# Patient Record
Sex: FEMALE | Race: WHITE | Employment: FULL TIME | URBAN - METROPOLITAN AREA
[De-identification: names, ages, dates, MRNs, and addresses within clinical notes are randomized per-mention and may not be internally consistent; named-entity substitution may affect disease eponyms.]

---

## 2024-03-01 ENCOUNTER — OFFICE VISIT (OUTPATIENT)
Dept: FAMILY MEDICINE CLINIC | Facility: CLINIC | Age: 49
End: 2024-03-01
Payer: COMMERCIAL

## 2024-03-01 VITALS
HEIGHT: 60 IN | OXYGEN SATURATION: 98 % | SYSTOLIC BLOOD PRESSURE: 112 MMHG | TEMPERATURE: 98.3 F | WEIGHT: 167 LBS | BODY MASS INDEX: 32.79 KG/M2 | HEART RATE: 83 BPM | DIASTOLIC BLOOD PRESSURE: 70 MMHG

## 2024-03-01 DIAGNOSIS — Z76.89 ENCOUNTER TO ESTABLISH CARE: ICD-10-CM

## 2024-03-01 DIAGNOSIS — F41.8 DEPRESSION WITH ANXIETY: Chronic | ICD-10-CM

## 2024-03-01 DIAGNOSIS — G89.29 NECK PAIN, CHRONIC: ICD-10-CM

## 2024-03-01 DIAGNOSIS — Z00.00 ROUTINE PHYSICAL EXAMINATION: ICD-10-CM

## 2024-03-01 DIAGNOSIS — G43.109 MIGRAINE WITH AURA AND WITHOUT STATUS MIGRAINOSUS, NOT INTRACTABLE: Chronic | ICD-10-CM

## 2024-03-01 DIAGNOSIS — Z12.4 CERVICAL CANCER SCREENING: ICD-10-CM

## 2024-03-01 DIAGNOSIS — Z12.11 COLON CANCER SCREENING: ICD-10-CM

## 2024-03-01 DIAGNOSIS — M54.2 NECK PAIN, CHRONIC: ICD-10-CM

## 2024-03-01 DIAGNOSIS — Z79.899 CONTROLLED SUBSTANCE AGREEMENT SIGNED: ICD-10-CM

## 2024-03-01 DIAGNOSIS — Z12.31 ENCOUNTER FOR SCREENING MAMMOGRAM FOR MALIGNANT NEOPLASM OF BREAST: ICD-10-CM

## 2024-03-01 DIAGNOSIS — F90.0 ATTENTION DEFICIT HYPERACTIVITY DISORDER (ADHD), PREDOMINANTLY INATTENTIVE TYPE: Chronic | ICD-10-CM

## 2024-03-01 DIAGNOSIS — J01.10 ACUTE NON-RECURRENT FRONTAL SINUSITIS: Primary | ICD-10-CM

## 2024-03-01 PROCEDURE — 99204 OFFICE O/P NEW MOD 45 MIN: CPT

## 2024-03-01 PROCEDURE — 99386 PREV VISIT NEW AGE 40-64: CPT

## 2024-03-01 RX ORDER — AZELASTINE 1 MG/ML
1 SPRAY, METERED NASAL 2 TIMES DAILY
COMMUNITY

## 2024-03-01 RX ORDER — METHOCARBAMOL 500 MG/1
500 TABLET, FILM COATED ORAL DAILY PRN
Qty: 30 TABLET | Refills: 0 | Status: SHIPPED | OUTPATIENT
Start: 2024-03-01

## 2024-03-01 RX ORDER — SUMATRIPTAN 25 MG/1
25 TABLET, FILM COATED ORAL ONCE AS NEEDED
Qty: 30 TABLET | Refills: 0 | Status: SHIPPED | OUTPATIENT
Start: 2024-03-01

## 2024-03-01 RX ORDER — FLUOXETINE HYDROCHLORIDE 20 MG/1
20 CAPSULE ORAL DAILY
COMMUNITY
Start: 2023-12-27

## 2024-03-01 RX ORDER — FLUTICASONE PROPIONATE 50 MCG
2 SPRAY, SUSPENSION (ML) NASAL DAILY
COMMUNITY
Start: 2023-10-17

## 2024-03-01 RX ORDER — SUMATRIPTAN 25 MG/1
25 TABLET, FILM COATED ORAL
COMMUNITY
Start: 2023-10-17 | End: 2024-03-01 | Stop reason: SDUPTHER

## 2024-03-01 RX ORDER — METHOCARBAMOL 500 MG/1
500 TABLET, FILM COATED ORAL DAILY PRN
COMMUNITY
Start: 2023-12-27 | End: 2024-03-01 | Stop reason: SDUPTHER

## 2024-03-01 RX ORDER — METHOCARBAMOL 750 MG/1
TABLET, FILM COATED ORAL
COMMUNITY
Start: 2023-12-27 | End: 2024-03-01 | Stop reason: DRUGHIGH

## 2024-03-01 RX ORDER — DEXTROAMPHETAMINE SACCHARATE, AMPHETAMINE ASPARTATE MONOHYDRATE, DEXTROAMPHETAMINE SULFATE AND AMPHETAMINE SULFATE 2.5; 2.5; 2.5; 2.5 MG/1; MG/1; MG/1; MG/1
20 CAPSULE, EXTENDED RELEASE ORAL EVERY MORNING
Qty: 30 CAPSULE | Refills: 0 | Status: SHIPPED | OUTPATIENT
Start: 2024-03-01

## 2024-03-01 RX ORDER — AZITHROMYCIN 250 MG/1
TABLET, FILM COATED ORAL
Qty: 6 TABLET | Refills: 0 | Status: SHIPPED | OUTPATIENT
Start: 2024-03-01 | End: 2024-03-05
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
## 2024-03-11 ENCOUNTER — APPOINTMENT (OUTPATIENT)
Dept: URGENT CARE | Facility: CLINIC | Age: 49
End: 2024-03-11
Payer: COMMERCIAL

## 2024-03-11 ENCOUNTER — APPOINTMENT (OUTPATIENT)
Dept: RADIOLOGY | Facility: CLINIC | Age: 49
End: 2024-03-11
Payer: COMMERCIAL

## 2024-03-11 ENCOUNTER — OFFICE VISIT (OUTPATIENT)
Dept: URGENT CARE | Facility: CLINIC | Age: 49
End: 2024-03-11
Payer: COMMERCIAL

## 2024-03-11 VITALS
RESPIRATION RATE: 18 BRPM | SYSTOLIC BLOOD PRESSURE: 125 MMHG | DIASTOLIC BLOOD PRESSURE: 78 MMHG | HEART RATE: 73 BPM | TEMPERATURE: 98.1 F | OXYGEN SATURATION: 98 %

## 2024-03-11 DIAGNOSIS — S82.831A CLOSED AVULSION FRACTURE OF DISTAL FIBULA, RIGHT, INITIAL ENCOUNTER: ICD-10-CM

## 2024-03-11 DIAGNOSIS — M25.571 ACUTE RIGHT ANKLE PAIN: ICD-10-CM

## 2024-03-11 DIAGNOSIS — M25.571 ACUTE RIGHT ANKLE PAIN: Primary | ICD-10-CM

## 2024-03-11 PROCEDURE — 73610 X-RAY EXAM OF ANKLE: CPT

## 2024-03-11 PROCEDURE — G0382 LEV 3 HOSP TYPE B ED VISIT: HCPCS

## 2024-03-11 NOTE — PROGRESS NOTES
[facility]        NAME: Lima Anglin is a 48 y.o. female  : 1975    MRN: [id number]  DATE: 2024  TIME: 2:02 PM    Assessment and Plan   Acute right ankle pain [M25.571]  1. Acute right ankle pain  XR ankle 3+ vw right      2. Closed avulsion fracture of distal fibula, right, initial encounter  Ambulatory Referral to Orthopedic Surgery        Xray showing possible distal fibula avulsion fracture. Will follow up with radiologist report when available. Placed in walking boot and referred to ortho. Offered crutches for partial weight bearing but declined.     Patient Instructions     Check my chart for final radiology results.   Rest and elevation.   Do not sleep with walking boot on.   Ice for 15min, 3-4x daily.  Tylenol/Motrin as needed for pain    If tests are performed, our office will contact you with results only if changes need to made to the care plan discussed with you at the visit. [facility]    PCP follow up.  Follow up with ortho.  Proceed to the ER with new or worsening symptoms such as pain, swelling, loss of color or sensation.     Chief Complaint     Chief Complaint   Patient presents with    Ankle Injury     Rolled ankle on Th.  States swelling and increased pain since. Does have the ankle wrapped. Indicates she iced, elevated, and took motrin.          History of Present Illness       The patient presents today with complaints of R ankle pain that started on Th. She states she twisted her ankle and fell. Was having pain and swelling. The swelling improved, but continues to have a burning pain. She has been icing the area, elevating, wrapped it with an ace bandage, and took motrin.         Review of Systems   Review of Systems   Musculoskeletal:  Positive for arthralgias (R ankle) and gait problem (R ankle). Negative for joint swelling.   Skin:  Negative for color change, rash and wound.         Current Medications        Current Outpatient Medications:     albuterol (PROVENTIL HFA,VENTOLIN HFA) 90 mcg/act inhaler, INHALE 2 PUFFS EVERY FOUR HOURS AS NEEDED FOR WHEEZING, Disp: , Rfl:     amphetamine-dextroamphetamine (ADDERALL XR) 10 MG 24 hr capsule, Take 2 capsules (20 mg total) by mouth every morning Max Daily Amount: 20 mg, Disp: 30 capsule, Rfl: 0    calcium carbonate-vitamin D 250 mg-3.125 mcg per tablet, Take 1 tablet by mouth daily, Disp: , Rfl:     cetirizine (ZyrTEC) 10 mg tablet, Take 10 mg by mouth daily, Disp: , Rfl:     EPINEPHrine (EPIPEN) 0.3 mg/0.3 mL SOAJ, inject 0.3 milliliters intramuscularly ONE TIME for 1 dose, Disp: , Rfl:     FLUoxetine (PROzac) 20 mg capsule, Take 20 mg by mouth daily, Disp: , Rfl:     fluticasone (FLONASE) 50 mcg/act nasal spray, 2 sprays into each nostril daily, Disp: , Rfl:     methocarbamol (ROBAXIN) 500 mg tablet, Take 1 tablet (500 mg total) by mouth daily as needed for muscle spasms, Disp: 30 tablet, Rfl: 0    montelukast (SINGULAIR) 10 mg tablet, Take 10 mg by mouth daily at bedtime, Disp: , Rfl:     SUMAtriptan (IMITREX) 25 mg tablet, Take 1 tablet (25 mg total) by mouth once as needed for migraine for up to 1 dose, Disp: 30 tablet, Rfl: 0    azelastine (ASTELIN) 0.1 % nasal spray, 1 spray into each nostril 2 (two) times a day Use in each nostril as directed, Disp: , Rfl:     Current Allergies     Allergies as of 03/11/2024 - Reviewed 03/11/2024   Allergen Reaction Noted    Codeine Edema 06/03/2020    Dog epithelium Sneezing 04/28/2015    Lactose intolerance (gi) - food allergy Diarrhea 02/01/2004    Peanut oil - food allergy Diarrhea 04/28/2015            The following portions of the patient's history were reviewed and updated as appropriate: allergies, current medications, past family history, past medical history, past social history, past surgical history and problem list.     Past Medical History:   Diagnosis Date    ADHD     Allergic     Asthma     Depression      Diverticulitis of colon     Obesity        Past Surgical History:   Procedure Laterality Date    BREAST SURGERY  2012       Family History   Problem Relation Age of Onset    Depression Mother     Diabetes Mother     ADD / ADHD Mother     Anxiety disorder Mother     Breast cancer Maternal Aunt          Medications have been verified.        Objective   /78   Pulse 73   Temp 98.1 °F (36.7 °C)   Resp 18   SpO2 98%        Physical Exam     Physical Exam  Vitals and nursing note reviewed.   Constitutional:       General: She is not in acute distress.     Appearance: Normal appearance.   HENT:      Head: Normocephalic and atraumatic.      Right Ear: External ear normal.      Left Ear: External ear normal.      Nose: Nose normal.      Mouth/Throat:      Lips: Pink.      Mouth: Mucous membranes are moist.   Eyes:      General: Vision grossly intact.      Extraocular Movements: Extraocular movements intact.      Pupils: Pupils are equal, round, and reactive to light.   Cardiovascular:      Rate and Rhythm: Normal rate.   Pulmonary:      Effort: Pulmonary effort is normal.   Musculoskeletal:         General: Normal range of motion.      Cervical back: Normal range of motion.      Right ankle: No swelling. Tenderness present. Normal range of motion. Normal pulse.      Left ankle: Normal.      Comments: R ankle: no swelling or ecchymosis. Full AROM; pain with dorsiflexion. TTP to middle of distal tibia. Sensation intact. Plus 2 pedal pulse.    Skin:     General: Skin is warm.   Neurological:      Mental Status: She is alert and oriented to person, place, and time.      Motor: Motor function is intact.      Gait: Gait is intact.   Psychiatric:         Attention and Perception: Attention normal.         Mood and Affect: Mood normal.

## 2024-03-11 NOTE — LETTER
March 11, 2024     Patient: Lima Anglin   YOB: 1975   Date of Visit: 3/11/2024       To Whom it May Concern:    Lima Anglin was seen in my clinic on 3/11/2024. She may return to work on 3/12/2024 .    If you have any questions or concerns, please don't hesitate to call.         Sincerely,          JG Banks        CC: No Recipients

## 2024-03-11 NOTE — PATIENT INSTRUCTIONS
Check my chart for final radiology results.   Rest and elevation.   Do not sleep with elastic bandage on.  Ice for 15min, 3-4x daily.  Tylenol/Motrin as needed for pain    If tests are performed, our office will contact you with results only if changes need to made to the care plan discussed with you at the visit. You can review your full results on [facility].    PCP follow up.  Follow up with ortho if pain does not improve over the next 5-7 days.   Proceed to the ER with new or worsening symptoms such as pain, swelling, loss of color or sensation.     Ankle Strain   WHAT YOU NEED TO KNOW:   A muscle strain is a twist, pull, or tear of a muscle or tendon in your ankle. An acute strain is a strain that happens suddenly. A chronic strain can happen over several days or weeks. A chronic strain can be caused by moving your ankle the same way over and over.  DISCHARGE INSTRUCTIONS:   Return to the emergency department if:   You have severe pain in your ankle when you rest or put pressure on it.     Your foot or toes are cold or numb.    Your swelling has increased.    Contact your healthcare provider if:   Your pain or swelling do not go away, even after treatment.    You have questions or concerns about your condition or care.    Medicines: You may need any of the following:  NSAIDs , such as ibuprofen, help decrease swelling, pain, and fever. This medicine is available with or without a doctor's order. NSAIDs can cause stomach bleeding or kidney problems in certain people. If you take blood thinner medicine, always ask if NSAIDs are safe for you. Always read the medicine label and follow directions. Do not give these medicines to children under 6 months of age without direction from your child's healthcare provider.     Acetaminophen  decreases pain. It is available without a doctor's order. Ask how much to take and how often to take it. Follow directions. Acetaminophen can cause liver damage if not taken  correctly.    Take your medicine as directed.  Contact your healthcare provider if you think your medicine is not helping or if you have side effects. Tell him of her if you are allergic to any medicine. Keep a list of the medicines, vitamins, and herbs you take. Include the amounts, and when and why you take them. Bring the list or the pill bottles to follow-up visits. Carry your medicine list with you in case of an emergency.    Self-care:   Rest  your ankle so that it can heal. Return to normal activities as directed.    Apply ice on your ankle for 15 to 20 minutes every hour or as directed. Use an ice pack, or put crushed ice in a plastic bag. Cover it with a towel. Ice helps prevent tissue damage and decreases swelling and pain.    Compress  your ankle as directed. Ask your healthcare provider how to wrap an elastic bandage around your ankle. An elastic bandage provides support and helps decrease swelling and movement so your ankle can heal. Wear it as long as directed.    Elevate  your ankle above the level of your heart as often as you can. This will help decrease swelling and pain. Prop your ankle on pillows or blankets to keep it elevated comfortably.       Prevent an ankle strain:   Always wear proper shoes when you play sports.  Replace your old running shoes with new ones often if you are a runner. Use special shoe inserts or arch supports to correct leg or foot problems. Ask your healthcare provider for more information on shoe supports.    Do warm up and cool down exercises.  Stretch before you work out or do sports activities. This will help loosen your muscles and prevent injury. Cool down and stretch after your workout. Do not stop and rest after a workout without cooling down first.     Do strength training exercises.  Exercises such as weight lifting help keep your muscles flexible and strong. A physical therapist or  may help you with these exercises.     Slowly start your exercise or  sports training program.  Follow your healthcare provider's advice on when to start exercising. Slowly increase time, distance, and intensity of your exercises. Sudden increases in how often or how intensely you train may cause you to injure your muscle again.    Follow up with your doctor as directed:  Write down your questions so you remember to ask them during your visits.   © Copyright [employer] 2021 Information is for End User's use only and may not be sold, redistributed or otherwise used for commercial purposes. All illustrations and images included in CareNotes® are the copyrighted property of [employer] [employer], [employer]. or [employer]  The above information is an  only. It is not intended as medical advice for individual conditions or treatments. Talk to your doctor, nurse or pharmacist before following any medical regimen to see if it is safe and effective for you.

## 2024-03-19 ENCOUNTER — OFFICE VISIT (OUTPATIENT)
Dept: URGENT CARE | Facility: CLINIC | Age: 49
End: 2024-03-19
Payer: COMMERCIAL

## 2024-03-19 VITALS
OXYGEN SATURATION: 98 % | SYSTOLIC BLOOD PRESSURE: 130 MMHG | HEART RATE: 86 BPM | TEMPERATURE: 97.7 F | DIASTOLIC BLOOD PRESSURE: 88 MMHG

## 2024-03-19 DIAGNOSIS — S93.401A SPRAIN OF RIGHT ANKLE, UNSPECIFIED LIGAMENT, INITIAL ENCOUNTER: Primary | ICD-10-CM

## 2024-03-19 PROCEDURE — G0382 LEV 3 HOSP TYPE B ED VISIT: HCPCS

## 2024-03-19 RX ORDER — IBUPROFEN 600 MG/1
600 TABLET ORAL EVERY 6 HOURS PRN
Qty: 28 TABLET | Refills: 0 | Status: SHIPPED | OUTPATIENT
Start: 2024-03-19 | End: 2024-03-26

## 2024-03-19 NOTE — LETTER
March 19, 2024     Patient: Lima Anglin   YOB: 1975   Date of Visit: 3/19/2024       To Whom it May Concern:    Lima Anglin was seen in my clinic on 3/19/2024. She may return on 03/20/2024.     If you have any questions or concerns, please don't hesitate to call.         Sincerely,          SATNAM Valdez        CC: No Recipients

## 2024-03-19 NOTE — PROGRESS NOTES
[facility]        NAME: Lima Anglin is a 48 y.o. female  : 1975    MRN: [id number]  DATE: 2024  TIME: 3:08 PM    Assessment and Plan   Sprain of right ankle, unspecified ligament, initial encounter [S93.401A]  1. Sprain of right ankle, unspecified ligament, initial encounter  ibuprofen (MOTRIN) 600 mg tablet      Continue rest, ice, compression and elevation for pain and swelling.   Ace wrap given in office.   Continue to alternate Tylenol and Ibuprofen as needed for pain. *NSAIDs (such as Ibuprofen, Aleve Advil, etc.) do increase the risk of GI bleed, particularly when taken in combination with certain medications such as SSRI antidepressants- do not take simultaneously*  Follow up with orthopedics as planned.     Patient Instructions   Ankle Sprain   WHAT YOU NEED TO KNOW:   An ankle sprain happens when 1 or more ligaments in your ankle joint stretch or tear. Ligaments are tough tissues that connect bones. Ligaments support your joints and keep your bones in place.  DISCHARGE INSTRUCTIONS:   Return to the emergency department if:   You have severe pain in your ankle.     Your foot or toes are cold or numb.     Your ankle becomes more weak or unstable (wobbly).     You are unable to put any weight on your ankle or foot.     Your swelling has increased or returned.     Call your doctor if:   Your pain does not go away, even after treatment.     You have questions or concerns about your condition or care.     Medicines:  You may need any of the following:  NSAIDs , such as ibuprofen, help decrease swelling, pain, and fever. This medicine is available with or without a doctor's order. NSAIDs can cause stomach bleeding or kidney problems in certain people. If you take blood thinner medicine, always ask your healthcare provider if NSAIDs are safe for you. Always read the medicine label and follow directions.     Acetaminophen  decreases pain and fever. It is available without a doctor's  order. Ask how much to take and how often to take it. Follow directions. Read the labels of all other medicines you are using to see if they also contain acetaminophen, or ask your doctor or pharmacist. Acetaminophen can cause liver damage if not taken correctly.     Prescription pain medicine  may be given. Ask your healthcare provider how to take this medicine safely. Some prescription pain medicines contain acetaminophen. Do not take other medicines that contain acetaminophen without talking to your healthcare provider. Too much acetaminophen may cause liver damage. Prescription pain medicine may cause constipation. Ask your healthcare provider how to prevent or treat constipation.      Take your medicine as directed.  Contact your healthcare provider if you think your medicine is not helping or if you have side effects. Tell your provider if you are allergic to any medicine. Keep a list of the medicines, vitamins, and herbs you take. Include the amounts, and when and why you take them. Bring the list or the pill bottles to follow-up visits. Carry your medicine list with you in case of an emergency.     Self-care:   Use support devices , such as a brace, cast, or splint, to limit your movement and protect your joint. You may need to use crutches to decrease your pain as you move around.     Go to physical therapy  as directed. A physical therapist teaches you exercises to help improve movement and strength, and to decrease pain.     Rest  your ankle so that it can heal. Return to normal activities as directed.     Apply ice  on your ankle for 15 to 20 minutes every hour or as directed. Use an ice pack, or put crushed ice in a plastic bag. Cover the ice pack or bag with a towel before you put it on your injury. Ice helps prevent tissue damage and decreases swelling and pain.     Compress  your ankle. Ask if you should wrap an elastic bandage around your injured ligament. An elastic bandage provides support and  helps decrease swelling and movement so your joint can heal. Wear as long as directed.          Elevate  your ankle above the level of your heart as often as you can. This will help decrease swelling and pain. Prop your ankle on pillows or blankets to keep it elevated comfortably.        Prevent another ankle sprain:   Let your ankle heal.  Find out how long your ligament needs to heal. Do not do any physical activity until your healthcare provider says it is okay. If you start activity too soon, you may develop a more serious injury.     Warm up and stretch before you exercise or play sports.  This helps your joints become strong and flexible.     Use the right equipment.  Always wear shoes that fit well and are made for the activity that you are doing. You may also need ankle supports, elbow and knee pads, or braces.     Follow up with your doctor as directed:  Write down your questions so you remember to ask them during your visits.  © Copyright [employer] 2023 Information is for End User's use only and may not be sold, redistributed or otherwise used for commercial purposes.  The above information is an  only. It is not intended as medical advice for individual conditions or treatments. Talk to your doctor, nurse or pharmacist before following any medical regimen to see if it is safe and effective for you.       Follow up with PCP in 3-5 days.  Proceed to  ER if symptoms worsen.    Chief Complaint     Chief Complaint   Patient presents with    Ankle Pain     Right ankle, pain. Patient is here today to see if her foot can be observed. Would like to have the provider explain the proper usage of the boot.          History of Present Illness       48 year old female, recently diagnosed with ankle sprain and possible avulsion fracture of lateral malleolus of right ankle after an inversion injury one week ago presents for evaluation of worsening pain with CAM boot. She was seen in this clinic one week ago  following the injury, and has been wearing a CAM boot since. Over the last 2-3 days, she reports noticing discomfort and irritation along the lateral aspect of her right ankle. She notes that a coworker advised her that she may be inflating the boot incorrectly. She also reports swelling today which resolved with compression and rest. She denies numbness, tingling, discoloration. She has yet to see the orthopedist and is waiting to reschedule her appointment due to recent viral illness.     Ankle Pain   Pertinent negatives include no numbness.       Review of Systems   Review of Systems   Constitutional:  Negative for fatigue and fever.   HENT:  Negative for congestion, ear discharge, ear pain, postnasal drip, rhinorrhea, sinus pressure, sinus pain, sneezing and sore throat.    Eyes: Negative.  Negative for pain, discharge, redness and itching.   Respiratory: Negative.  Negative for apnea, cough, choking, chest tightness, shortness of breath and stridor.    Cardiovascular: Negative.  Negative for chest pain and palpitations.   Gastrointestinal: Negative.  Negative for diarrhea, nausea and vomiting.   Endocrine: Negative.  Negative for polydipsia, polyphagia and polyuria.   Genitourinary: Negative.  Negative for decreased urine volume and flank pain.   Musculoskeletal:  Positive for arthralgias, gait problem and joint swelling. Negative for back pain, myalgias, neck pain and neck stiffness.   Skin: Negative.  Negative for color change and rash.   Allergic/Immunologic: Negative.  Negative for environmental allergies.   Neurological:  Negative for dizziness, facial asymmetry, light-headedness, numbness and headaches.   Hematological: Negative.  Negative for adenopathy.   Psychiatric/Behavioral: Negative.           Current Medications       Current Outpatient Medications:     ibuprofen (MOTRIN) 600 mg tablet, Take 1 tablet (600 mg total) by mouth every 6 (six) hours as needed for mild pain or moderate pain for up to 7  days, Disp: 28 tablet, Rfl: 0    albuterol (PROVENTIL HFA,VENTOLIN HFA) 90 mcg/act inhaler, INHALE 2 PUFFS EVERY FOUR HOURS AS NEEDED FOR WHEEZING, Disp: , Rfl:     amphetamine-dextroamphetamine (ADDERALL XR) 10 MG 24 hr capsule, Take 2 capsules (20 mg total) by mouth every morning Max Daily Amount: 20 mg, Disp: 30 capsule, Rfl: 0    azelastine (ASTELIN) 0.1 % nasal spray, 1 spray into each nostril 2 (two) times a day Use in each nostril as directed, Disp: , Rfl:     calcium carbonate-vitamin D 250 mg-3.125 mcg per tablet, Take 1 tablet by mouth daily, Disp: , Rfl:     cetirizine (ZyrTEC) 10 mg tablet, Take 10 mg by mouth daily, Disp: , Rfl:     EPINEPHrine (EPIPEN) 0.3 mg/0.3 mL SOAJ, inject 0.3 milliliters intramuscularly ONE TIME for 1 dose, Disp: , Rfl:     FLUoxetine (PROzac) 20 mg capsule, Take 20 mg by mouth daily, Disp: , Rfl:     fluticasone (FLONASE) 50 mcg/act nasal spray, 2 sprays into each nostril daily, Disp: , Rfl:     methocarbamol (ROBAXIN) 500 mg tablet, Take 1 tablet (500 mg total) by mouth daily as needed for muscle spasms, Disp: 30 tablet, Rfl: 0    montelukast (SINGULAIR) 10 mg tablet, Take 10 mg by mouth daily at bedtime, Disp: , Rfl:     SUMAtriptan (IMITREX) 25 mg tablet, Take 1 tablet (25 mg total) by mouth once as needed for migraine for up to 1 dose, Disp: 30 tablet, Rfl: 0    Current Allergies     Allergies as of 03/19/2024 - Reviewed 03/19/2024   Allergen Reaction Noted    Codeine Edema 06/03/2020    Dog epithelium Sneezing 04/28/2015    Lactose intolerance (gi) - food allergy Diarrhea 02/01/2004    Peanut oil - food allergy Diarrhea 04/28/2015            The following portions of the patient's history were reviewed and updated as appropriate: allergies, current medications, past family history, past medical history, past social history, past surgical history and problem list.     Past Medical History:   Diagnosis Date    ADHD     Allergic     Asthma     Depression     Diverticulitis of  colon     Obesity        Past Surgical History:   Procedure Laterality Date    BREAST SURGERY  2012       Family History   Problem Relation Age of Onset    Depression Mother     Diabetes Mother     ADD / ADHD Mother     Anxiety disorder Mother     Breast cancer Maternal Aunt          Medications have been verified.        Objective   /88   Pulse 86   Temp 97.7 °F (36.5 °C)   SpO2 98%        Physical Exam     Physical Exam  Vitals and nursing note reviewed.   Constitutional:       General: She is not in acute distress.     Appearance: Normal appearance. She is not ill-appearing, toxic-appearing or diaphoretic.   HENT:      Head: Normocephalic and atraumatic.      Right Ear: External ear normal.      Left Ear: External ear normal.      Nose: Nose normal. No congestion or rhinorrhea.      Mouth/Throat:      Mouth: Mucous membranes are moist.   Eyes:      Extraocular Movements: Extraocular movements intact.      Conjunctiva/sclera: Conjunctivae normal.      Pupils: Pupils are equal, round, and reactive to light.   Cardiovascular:      Rate and Rhythm: Normal rate and regular rhythm.      Pulses: Normal pulses.      Heart sounds: Normal heart sounds. No murmur heard.     No friction rub. No gallop.   Pulmonary:      Effort: Pulmonary effort is normal. No respiratory distress.      Breath sounds: Normal breath sounds. No stridor. No wheezing, rhonchi or rales.   Abdominal:      General: Bowel sounds are normal.      Palpations: Abdomen is soft.      Tenderness: There is no abdominal tenderness. There is no guarding or rebound.   Musculoskeletal:         General: Normal range of motion.      Cervical back: Normal range of motion and neck supple. No tenderness.      Right foot: Normal range of motion and normal capillary refill. Tenderness present. No swelling, deformity, bunion, Charcot foot, foot drop, prominent metatarsal heads, laceration, bony tenderness or crepitus. Normal pulse.        Legs:       Comments:  (+) TTP of right lateral malleolus.    Skin:     General: Skin is warm and dry.      Capillary Refill: Capillary refill takes less than 2 seconds.   Neurological:      General: No focal deficit present.      Mental Status: She is alert and oriented to person, place, and time.      Cranial Nerves: No cranial nerve deficit.   Psychiatric:         Mood and Affect: Mood normal.         Behavior: Behavior normal.

## 2024-03-19 NOTE — PATIENT INSTRUCTIONS
Continue rest, ice, compression and elevation for pain and swelling.   Ace wrap given in office.   Continue to alternate Tylenol and Ibuprofen as needed for pain. *NSAIDs (such as Ibuprofen, Aleve Advil, etc.) do increase the risk of GI bleed, particularly when taken in combination with certain medications such as SSRI antidepressants- do not take simultaneously*  Follow up with orthopedics as planned.

## 2024-03-25 ENCOUNTER — TELEPHONE (OUTPATIENT)
Dept: OBGYN CLINIC | Facility: HOSPITAL | Age: 49
End: 2024-03-25

## 2024-03-25 NOTE — TELEPHONE ENCOUNTER
Caller: [employer]    Doctor: Coty    Reason for call:  Please see Work Comp Info Below: (Upcoming Appointment)    DOI: 03/07/24    Claim# [id number]     Bill Claims: [address]  [facility], [address]     Name: Martha Clay    Phone# [phone]    Fax# (generic)- Does not have  Fax    [phone]    Call back#: [phone]

## 2024-03-29 ENCOUNTER — OFFICE VISIT (OUTPATIENT)
Dept: FAMILY MEDICINE CLINIC | Facility: CLINIC | Age: 49
End: 2024-03-29
Payer: COMMERCIAL

## 2024-03-29 VITALS
HEIGHT: 60 IN | HEART RATE: 84 BPM | WEIGHT: 167 LBS | BODY MASS INDEX: 32.79 KG/M2 | OXYGEN SATURATION: 99 % | SYSTOLIC BLOOD PRESSURE: 138 MMHG | DIASTOLIC BLOOD PRESSURE: 88 MMHG | TEMPERATURE: 97.3 F

## 2024-03-29 DIAGNOSIS — F90.0 ATTENTION DEFICIT HYPERACTIVITY DISORDER (ADHD), PREDOMINANTLY INATTENTIVE TYPE: Primary | ICD-10-CM

## 2024-03-29 DIAGNOSIS — G89.29 NECK PAIN, CHRONIC: ICD-10-CM

## 2024-03-29 DIAGNOSIS — M54.2 NECK PAIN, CHRONIC: ICD-10-CM

## 2024-03-29 PROCEDURE — 99214 OFFICE O/P EST MOD 30 MIN: CPT

## 2024-03-29 RX ORDER — DEXTROAMPHETAMINE SACCHARATE, AMPHETAMINE ASPARTATE MONOHYDRATE, DEXTROAMPHETAMINE SULFATE AND AMPHETAMINE SULFATE 2.5; 2.5; 2.5; 2.5 MG/1; MG/1; MG/1; MG/1
20 CAPSULE, EXTENDED RELEASE ORAL EVERY MORNING
Qty: 60 CAPSULE | Refills: 0 | Status: SHIPPED | OUTPATIENT
Start: 2024-03-29

## 2024-03-29 RX ORDER — METHOCARBAMOL 500 MG/1
500 TABLET, FILM COATED ORAL DAILY PRN
Qty: 30 TABLET | Refills: 0 | Status: SHIPPED | OUTPATIENT
Start: 2024-03-29

## 2024-03-29 NOTE — PROGRESS NOTES
Name: Lima Anglin      : 1975      MRN: [id number]  Encounter Provider: Lilly Tillman PA-C  Encounter Date: 3/29/2024   Encounter department: [facility]    Assessment & Plan     1. Attention deficit hyperactivity disorder (ADHD), predominantly inattentive type  -     [facility] Prescribed Meds and Special Instructions  -     Amphetamines, Methamphetamines  -     Butalbital  -     Phenobarbital  -     Secobarbital  -     Alprazolam  -     Clonazepam  -     Diazepam, Temazepam, Oxazepam  -     Lorazepam  -     Gabapentin  -     Pregabalin  -     Cocaine  -     Heroin  -     Buprenorphine  -     Levorphanol  -     Meperidine  -     Naltrexone  -     Fentanyl  -     Methadone  -     Oxycodone  -     Tapentadol  -     THC  -     Tramadol  -     Codeine, Hydrocodone, Hydropmorphone, Morphine  -     Bath Salts  -     Ethyl Glucuronide/Ethyl Sulfate  -     Kratom  -     Spice  -     Methylphenidate  -     Phentermine  -     Validity Oxidant  -     Validity Creatinine  -     Validity pH  -     Validity Specific  -     Xylazine Definitive Test  -     amphetamine-dextroamphetamine (ADDERALL XR) 10 MG 24 hr capsule; Take 2 capsules (20 mg total) by mouth every morning Max Daily Amount: 20 mg    2. Neck pain, chronic  -     methocarbamol (ROBAXIN) 500 mg tablet; Take 1 tablet (500 mg total) by mouth daily as needed for muscle spasms    Patient presents for one month ADHD follow up. Currently taking Adderall 10 mg ER BID which she has been doing well on; no side effects and feeling more focused/concentrated. UDS completed at today's visit. Otherwise follow up in three months for next scheduled follow up or sooner as needed. To call with any questions or concerns. Refilled medication at today's visit along with her robaxin.        Subjective      CC: ADHD one month follow up     Patient presents for one month medication follow up. Was restarted on her adderall 10 mg ER BID one month ago; UDS  to be completed at today's visit. Patient notes she has been feeling much better since restarting medication; more concentrated and focused especially at work. Denies any side effects with the medication. Doing well overall.       Review of Systems   Constitutional:  Negative for appetite change.   Respiratory:  Negative for chest tightness, shortness of breath and wheezing.    Cardiovascular:  Negative for chest pain, palpitations and leg swelling.   Gastrointestinal:  Negative for abdominal pain.   Neurological:  Negative for dizziness, light-headedness and headaches.       Current Outpatient Medications on File Prior to Visit   Medication Sig    albuterol (PROVENTIL HFA,VENTOLIN HFA) 90 mcg/act inhaler INHALE 2 PUFFS EVERY FOUR HOURS AS NEEDED FOR WHEEZING    azelastine (ASTELIN) 0.1 % nasal spray 1 spray into each nostril 2 (two) times a day Use in each nostril as directed    calcium carbonate-vitamin D 250 mg-3.125 mcg per tablet Take 1 tablet by mouth daily    cetirizine (ZyrTEC) 10 mg tablet Take 10 mg by mouth daily    EPINEPHrine (EPIPEN) 0.3 mg/0.3 mL SOAJ inject 0.3 milliliters intramuscularly ONE TIME for 1 dose    FLUoxetine (PROzac) 20 mg capsule Take 20 mg by mouth daily    fluticasone (FLONASE) 50 mcg/act nasal spray 2 sprays into each nostril daily    ibuprofen (MOTRIN) 600 mg tablet Take 1 tablet (600 mg total) by mouth every 6 (six) hours as needed for mild pain or moderate pain for up to 7 days    montelukast (SINGULAIR) 10 mg tablet Take 10 mg by mouth daily at bedtime    SUMAtriptan (IMITREX) 25 mg tablet Take 1 tablet (25 mg total) by mouth once as needed for migraine for up to 1 dose    [DISCONTINUED] amphetamine-dextroamphetamine (ADDERALL XR) 10 MG 24 hr capsule Take 2 capsules (20 mg total) by mouth every morning Max Daily Amount: 20 mg    [DISCONTINUED] methocarbamol (ROBAXIN) 500 mg tablet Take 1 tablet (500 mg total) by mouth daily as needed for muscle spasms       Objective     BP  138/88   Pulse 84   Temp (!) 97.3 °F (36.3 °C)   Ht 5' (1.524 m)   Wt 75.8 kg (167 lb)   SpO2 99%   BMI 32.61 kg/m²     Physical Exam  Constitutional:       General: She is not in acute distress.     Appearance: Normal appearance. She is not ill-appearing or diaphoretic.   HENT:      Head: Normocephalic and atraumatic.      Right Ear: External ear normal.      Left Ear: External ear normal.      Nose: Nose normal.      Mouth/Throat:      Mouth: Mucous membranes are moist.   Eyes:      General:         Right eye: No discharge.         Left eye: No discharge.      Conjunctiva/sclera: Conjunctivae normal.   Cardiovascular:      Rate and Rhythm: Normal rate and regular rhythm.      Pulses: Normal pulses.      Heart sounds: Normal heart sounds. No murmur heard.  Pulmonary:      Effort: Pulmonary effort is normal. No respiratory distress.      Breath sounds: Normal breath sounds. No wheezing, rhonchi or rales.   Musculoskeletal:         General: Normal range of motion.      Cervical back: Normal range of motion.   Skin:     General: Skin is warm.   Neurological:      General: No focal deficit present.      Mental Status: She is alert.      Gait: Gait normal.   Psychiatric:         Mood and Affect: Mood normal.       Lilly Tillman PA-C

## 2024-04-03 ENCOUNTER — TELEPHONE (OUTPATIENT)
Dept: OBGYN CLINIC | Facility: CLINIC | Age: 49
End: 2024-04-03

## 2024-04-03 LAB
4OH-XYLAZINE UR QL CFM: NEGATIVE NG/ML
6MAM UR QL CFM: NEGATIVE NG/ML
7AMINOCLONAZEPAM UR QL CFM: NEGATIVE NG/ML
A-OH ALPRAZ UR QL CFM: NEGATIVE NG/ML
ACCEPTABLE CREAT UR QL: NORMAL MG/DL
ACCEPTIBLE SP GR UR QL: NORMAL
AMPHET UR QL CFM: NORMAL NG/ML
BUPRENORPHINE UR QL CFM: NEGATIVE NG/ML
BUTALBITAL UR QL CFM: NEGATIVE NG/ML
BZE UR QL CFM: NEGATIVE NG/ML
CODEINE UR QL CFM: NEGATIVE NG/ML
EDDP UR QL CFM: NEGATIVE NG/ML
ETHYL GLUCURONIDE UR QL CFM: NEGATIVE NG/ML
ETHYL SULFATE UR QL SCN: NEGATIVE NG/ML
EUTYLONE UR QL: NEGATIVE NG/ML
FENTANYL UR QL CFM: NEGATIVE NG/ML
GLIADIN IGG SER IA-ACNC: NEGATIVE NG/ML
HYDROCODONE UR QL CFM: NEGATIVE NG/ML
HYDROMORPHONE UR QL CFM: NEGATIVE NG/ML
LORAZEPAM UR QL CFM: NEGATIVE NG/ML
ME-PHENIDATE UR QL CFM: NEGATIVE NG/ML
MEPERIDINE UR QL CFM: NEGATIVE NG/ML
METHADONE UR QL CFM: NEGATIVE NG/ML
METHAMPHET UR QL CFM: NEGATIVE NG/ML
MORPHINE UR QL CFM: NEGATIVE NG/ML
NALTREXONE UR QL CFM: NEGATIVE NG/ML
NITRITE UR QL: NORMAL UG/ML
NORBUPRENORPHINE UR QL CFM: NEGATIVE NG/ML
NORDIAZEPAM UR QL CFM: NEGATIVE NG/ML
NORFENTANYL UR QL CFM: NEGATIVE NG/ML
NORHYDROCODONE UR QL CFM: NEGATIVE NG/ML
NORMEPERIDINE UR QL CFM: NEGATIVE NG/ML
NOROXYCODONE UR QL CFM: NEGATIVE NG/ML
OXAZEPAM UR QL CFM: NEGATIVE NG/ML
OXYCODONE UR QL CFM: NEGATIVE NG/ML
OXYMORPHONE UR QL CFM: NEGATIVE NG/ML
PARA-FLUOROFENTANYL QUANTIFICATION: NORMAL NG/ML
PHENOBARB UR QL CFM: NEGATIVE NG/ML
RESULT ALL_PRESCRIBED MEDS AND SPECIAL INSTRUCTIONS: NORMAL
SECOBARBITAL UR QL CFM: NEGATIVE NG/ML
SL AMB 4-ANPP QUANTIFICATION: NORMAL NG/ML
SL AMB 5F-ADB-M7 METABOLITE QUANTIFICATION: NEGATIVE NG/ML
SL AMB 7-OH-MITRAGYNINE (KRATOM ALKALOID) QUANTIFICATION: NEGATIVE NG/ML
SL AMB AB-FUBINACA-M3 METABOLITE QUANTIFICATION: NEGATIVE NG/ML
SL AMB ACETYL FENTANYL QUANTIFICATION: NORMAL NG/ML
SL AMB ACETYL NORFENTANYL QUANTIFICATION: NORMAL NG/ML
SL AMB ACRYL FENTANYL QUANTIFICATION: NORMAL NG/ML
SL AMB CARFENTANIL QUANTIFICATION: NORMAL NG/ML
SL AMB CTHC (MARIJUANA METABOLITE) QUANTIFICATION: NEGATIVE NG/ML
SL AMB DEXTRORPHAN (DEXTROMETHORPHAN METABOLITE) QUANT: NEGATIVE NG/ML
SL AMB GABAPENTIN QUANTIFICATION: NEGATIVE
SL AMB JWH018 METABOLITE QUANTIFICATION: NEGATIVE NG/ML
SL AMB JWH073 METABOLITE QUANTIFICATION: NEGATIVE NG/ML
SL AMB MDMB-FUBINACA-M1 METABOLITE QUANTIFICATION: NEGATIVE NG/ML
SL AMB METHYLONE QUANTIFICATION: NEGATIVE NG/ML
SL AMB N-DESMETHYL-TRAMADOL QUANTIFICATION: NEGATIVE NG/ML
SL AMB PHENTERMINE QUANTIFICATION: NEGATIVE NG/ML
SL AMB PREGABALIN QUANTIFICATION: NEGATIVE
SL AMB RCS4 METABOLITE QUANTIFICATION: NEGATIVE NG/ML
SL AMB RITALINIC ACID QUANTIFICATION: NEGATIVE NG/ML
SMOOTH MUSCLE AB TITR SER IF: NEGATIVE NG/ML
SPECIMEN DRAWN SERPL: NEGATIVE NG/ML
SPECIMEN PH ACCEPTABLE UR: NORMAL
TAPENTADOL UR QL CFM: NEGATIVE NG/ML
TEMAZEPAM UR QL CFM: NEGATIVE NG/ML
TRAMADOL UR QL CFM: NEGATIVE NG/ML
URATE/CREAT 24H UR: NEGATIVE NG/ML
XYLAZINE UR QL CFM: NEGATIVE NG/ML

## 2024-04-15 DIAGNOSIS — J30.2 SEASONAL ALLERGIC RHINITIS, UNSPECIFIED TRIGGER: Primary | Chronic | ICD-10-CM

## 2024-04-15 DIAGNOSIS — F41.8 DEPRESSION WITH ANXIETY: Chronic | ICD-10-CM

## 2024-04-15 RX ORDER — MONTELUKAST SODIUM 10 MG/1
10 TABLET ORAL
Qty: 30 TABLET | Refills: 0 | Status: SHIPPED | OUTPATIENT
Start: 2024-04-15

## 2024-04-15 RX ORDER — FLUOXETINE HYDROCHLORIDE 20 MG/1
20 CAPSULE ORAL DAILY
Qty: 30 CAPSULE | Refills: 0 | Status: SHIPPED | OUTPATIENT
Start: 2024-04-15

## 2024-04-26 ENCOUNTER — OFFICE VISIT (OUTPATIENT)
Dept: OBGYN CLINIC | Facility: CLINIC | Age: 49
End: 2024-04-26
Payer: OTHER MISCELLANEOUS

## 2024-04-26 VITALS
HEART RATE: 91 BPM | HEIGHT: 60 IN | BODY MASS INDEX: 31.22 KG/M2 | SYSTOLIC BLOOD PRESSURE: 121 MMHG | DIASTOLIC BLOOD PRESSURE: 81 MMHG | WEIGHT: 159 LBS

## 2024-04-26 DIAGNOSIS — J30.9 ALLERGIC RHINITIS, UNSPECIFIED SEASONALITY, UNSPECIFIED TRIGGER: Primary | Chronic | ICD-10-CM

## 2024-04-26 DIAGNOSIS — F90.0 ATTENTION DEFICIT HYPERACTIVITY DISORDER (ADHD), PREDOMINANTLY INATTENTIVE TYPE: ICD-10-CM

## 2024-04-26 DIAGNOSIS — S96.911A RIGHT ANKLE STRAIN, INITIAL ENCOUNTER: ICD-10-CM

## 2024-04-26 DIAGNOSIS — M54.2 NECK PAIN, CHRONIC: ICD-10-CM

## 2024-04-26 DIAGNOSIS — S90.01XA CONTUSION OF RIGHT ANKLE, INITIAL ENCOUNTER: ICD-10-CM

## 2024-04-26 DIAGNOSIS — G89.29 NECK PAIN, CHRONIC: ICD-10-CM

## 2024-04-26 DIAGNOSIS — S93.491A SPRAIN OF ANTERIOR TALOFIBULAR LIGAMENT OF RIGHT ANKLE, INITIAL ENCOUNTER: Primary | ICD-10-CM

## 2024-04-26 PROCEDURE — 99203 OFFICE O/P NEW LOW 30 MIN: CPT | Performed by: FAMILY MEDICINE

## 2024-04-26 NOTE — LETTER
April 26, 2024     Patient: Lima Anglin  YOB: 1975  Date of Visit: 4/26/2024      To Whom it May Concern:    Lima Anglin is under my professional care. January was seen in my office on 4/26/2024. January may work with restrictions: wear ankle brace at all times.    January will be re-evaluated in 5 weeks.    If you have any questions or concerns, please don't hesitate to call.         Sincerely,          Donte Leonard DO        CC: No Recipients

## 2024-04-26 NOTE — PATIENT INSTRUCTIONS
Over the counter vitamins:    - turmeric vitamin at least 1000 mg daily    - tart cherry vitamin at least 1000 mg daily    Over the counter diclofenac/voltaren gel  - 3 times daily for 10 days    Ankle brace    Physical therapy and home exercises

## 2024-04-26 NOTE — PROGRESS NOTES
Assessment/Plan:  Assessment/Plan   Diagnoses and all orders for this visit:    Sprain of anterior talofibular ligament of right ankle, initial encounter  -     Brace  -     Ambulatory Referral to Physical Therapy; Future    Right ankle strain, initial encounter  -     Ambulatory Referral to Physical Therapy; Future    Contusion of right ankle, initial encounter  -     Ambulatory Referral to Physical Therapy; Future      48-year-old female with onset of right ankle pain from twisting injury outside of work on 3/7/2024.  Discussed with patient physical exam, radiographs, impression, and plan.  X-rays right ankle noted for well-corticated osseous fragment at the tip of the lateral malleolus.  Physical exam right ankle noted for mild tenderness at the ATFL, anterior ankle, and fibula.  She has intact range of motion of the ankle. There is mild weakness with inversion and eversion at 4+/5. Passive external rotation and syndesmosis squeeze are unremarkable.  She demonstrates heel walk and toe walk, however unable to do squatting due to pain at the anterior lateral aspect the ankle.  She is intact neurovascular.  Clinical impression is that osseous fragment is chronic and not acute.  She likely has symptoms from sprain.  I discussed treatment regimen of formal therapy, supplements, and topical anti-inflammatory.  She is to discontinue cam boot and I provided her lace up ankle brace to wear for support.  She is to take turmeric vitamin at least 1000 mg daily, tart cherry vitamin at least 1000 mg daily.  He is to apply topical diclofenac gel 3 times daily for the next 10 days.  She is to start physical therapy as soon as possible and do home exercises as directed.  She will follow-up in 5 weeks at which point she will be reevaluated.          Subjective:   Patient ID: Lima Anglin is a 48 y.o. female.  Chief Complaint   Patient presents with    Right Ankle - Pain        48-year-old female presents for evaluation right  ankle pain sustained from twisting injury outside of work on 3/7/2024.  She stepped into a hole in a parking lot and her ankle twisted.  She had pain described as sudden in onset, generalized and ankle but worse at the lateral and anterior aspects, radiating proximal to the lower leg, worse with bearing weight and ambulating, associated with swelling, and improved with resting.  She was seen in urgent care where x-ray evaluation was noted for irregularity at the lateral malleolus.  She was placed in cam boot and advised on resting.  She treated with icing, elevating, and taking ibuprofen.  She states that symptoms have been improving, but are still bothersome.  At home she ambulates with a cam boot and states that after about 2 hours of standing walking without cam boot she has worsening pain and swelling.  During the day when she is ambulating in cam boot she is able to do so for prolonged duration with little discomfort.  She does report that she is now started experiencing pain more at the anterior medial aspect the ankle.    Ankle Pain  This is a new problem. The current episode started more than 1 month ago. The problem occurs daily. The problem has been gradually improving. Associated symptoms include arthralgias and joint swelling. Pertinent negatives include no numbness or weakness. The symptoms are aggravated by standing, walking and twisting. She has tried rest, position changes, immobilization, NSAIDs and ice for the symptoms. The treatment provided mild relief.           The following portions of the patient's history were reviewed and updated as appropriate: She  has a past medical history of ADHD, Allergic, Asthma, Depression, Diverticulitis of colon, and Obesity.  She is allergic to codeine, dog epithelium, lactose intolerance (gi) - food allergy, and peanut oil - food allergy..    Review of Systems   Musculoskeletal:  Positive for arthralgias and joint swelling.   Neurological:  Negative for weakness  and numbness.       Objective:  Vitals:    04/26/24 1256   BP: 121/81   Pulse: 91   Weight: 72.1 kg (159 lb)   Height: 5' (1.524 m)      Right Ankle Exam     Range of Motion   The patient has normal right ankle ROM.    Muscle Strength   Dorsiflexion:  5/5  Plantar flexion:  5/5    Other   Sensation: normal  Pulse: present           Observations     Right Ankle/Foot   Negative for deformity.     Tenderness     Right Ankle/Foot   Tenderness in the anterior ankle, anterior talofibular ligament and lateral malleolus. No tenderness in the Achilles insertion, fifth metatarsal base, calcaneofibular ligament, deltoid ligament, dorsum foot, medial malleolus, peroneal tendon, plantar fascia, posterior tibial tendon, posterior talofibular ligament and proximal Achilles.     Strength/Myotome Testing     Right Ankle/Foot   Dorsiflexion: 5  Plantar flexion: 5  Inversion: 4+  Eversion: 4+    Tests     Right Ankle/Foot   Negative for anterior drawer, calcaneal squeeze, metatarsal squeeze, posterior drawer, syndesmosis squeeze and syndesmosis external rotation.       Physical Exam  Vitals and nursing note reviewed.   Constitutional:       Appearance: Normal appearance. She is well-developed. She is not ill-appearing or diaphoretic.   HENT:      Head: Normocephalic and atraumatic.      Right Ear: External ear normal.      Left Ear: External ear normal.   Eyes:      Conjunctiva/sclera: Conjunctivae normal.   Neck:      Trachea: No tracheal deviation.   Cardiovascular:      Rate and Rhythm: Normal rate.   Pulmonary:      Effort: Pulmonary effort is normal. No respiratory distress.   Abdominal:      General: There is no distension.   Musculoskeletal:         General: Tenderness present.      Right ankle: Tenderness present over the lateral malleolus and ATF ligament. No medial malleolus, CF ligament, posterior TF ligament or base of 5th metatarsal tenderness. Anterior drawer test negative.      Right foot: No deformity.   Skin:      General: Skin is warm and dry.      Coloration: Skin is not jaundiced or pale.   Neurological:      Mental Status: She is alert and oriented to person, place, and time.   Psychiatric:         Mood and Affect: Mood normal.         Behavior: Behavior normal.         Thought Content: Thought content normal.         Judgment: Judgment normal.         I have personally reviewed pertinent films in PACS and my interpretation is  .  X-rays right ankle noted for well-corticated osseous fragment at the tip of the lateral malleolus.

## 2024-04-29 ENCOUNTER — TELEPHONE (OUTPATIENT)
Age: 49
End: 2024-04-29

## 2024-04-29 RX ORDER — CETIRIZINE HYDROCHLORIDE 10 MG/1
10 TABLET ORAL DAILY
Qty: 30 TABLET | Refills: 2 | Status: SHIPPED | OUTPATIENT
Start: 2024-04-29

## 2024-04-29 RX ORDER — METHOCARBAMOL 500 MG/1
500 TABLET, FILM COATED ORAL DAILY PRN
Qty: 30 TABLET | Refills: 0 | Status: SHIPPED | OUTPATIENT
Start: 2024-04-29

## 2024-04-29 RX ORDER — DEXTROAMPHETAMINE SACCHARATE, AMPHETAMINE ASPARTATE MONOHYDRATE, DEXTROAMPHETAMINE SULFATE AND AMPHETAMINE SULFATE 2.5; 2.5; 2.5; 2.5 MG/1; MG/1; MG/1; MG/1
20 CAPSULE, EXTENDED RELEASE ORAL EVERY MORNING
Qty: 60 CAPSULE | Refills: 0 | Status: SHIPPED | OUTPATIENT
Start: 2024-04-29

## 2024-04-29 NOTE — TELEPHONE ENCOUNTER
Caller: Emery    Doctor/Office: Horacio    CB#: [phone]      What needs to be faxed: AVS and work note from 4/26    ATTN to: Jessica    Fax#: [phone]      Documents were successfully e-faxed

## 2024-05-10 ENCOUNTER — TELEPHONE (OUTPATIENT)
Age: 49
End: 2024-05-10

## 2024-05-10 NOTE — TELEPHONE ENCOUNTER
Caller: YUDELKA Dewitt     Doctor: Horacio     Reason for call: asked if patient has made a follow up appointment, information was given

## 2024-05-17 DIAGNOSIS — F41.8 DEPRESSION WITH ANXIETY: Chronic | ICD-10-CM

## 2024-05-17 DIAGNOSIS — J30.2 SEASONAL ALLERGIC RHINITIS, UNSPECIFIED TRIGGER: Chronic | ICD-10-CM

## 2024-05-17 RX ORDER — MONTELUKAST SODIUM 10 MG/1
10 TABLET ORAL
Qty: 30 TABLET | Refills: 0 | Status: SHIPPED | OUTPATIENT
Start: 2024-05-17

## 2024-05-17 RX ORDER — FLUOXETINE HYDROCHLORIDE 20 MG/1
20 CAPSULE ORAL DAILY
Qty: 30 CAPSULE | Refills: 0 | Status: SHIPPED | OUTPATIENT
Start: 2024-05-17

## 2024-05-28 DIAGNOSIS — J30.2 SEASONAL ALLERGIC RHINITIS, UNSPECIFIED TRIGGER: Chronic | ICD-10-CM

## 2024-05-28 DIAGNOSIS — M54.2 NECK PAIN, CHRONIC: ICD-10-CM

## 2024-05-28 DIAGNOSIS — G89.29 NECK PAIN, CHRONIC: ICD-10-CM

## 2024-05-28 DIAGNOSIS — F90.0 ATTENTION DEFICIT HYPERACTIVITY DISORDER (ADHD), PREDOMINANTLY INATTENTIVE TYPE: ICD-10-CM

## 2024-05-28 DIAGNOSIS — J30.9 ALLERGIC RHINITIS, UNSPECIFIED SEASONALITY, UNSPECIFIED TRIGGER: Chronic | ICD-10-CM

## 2024-05-28 DIAGNOSIS — F41.8 DEPRESSION WITH ANXIETY: Chronic | ICD-10-CM

## 2024-05-28 RX ORDER — MONTELUKAST SODIUM 10 MG/1
10 TABLET ORAL
Qty: 30 TABLET | Refills: 5 | Status: SHIPPED | OUTPATIENT
Start: 2024-05-28

## 2024-05-28 RX ORDER — FLUOXETINE HYDROCHLORIDE 20 MG/1
20 CAPSULE ORAL DAILY
Qty: 30 CAPSULE | Refills: 5 | Status: SHIPPED | OUTPATIENT
Start: 2024-05-28

## 2024-05-28 RX ORDER — CETIRIZINE HYDROCHLORIDE 10 MG/1
10 TABLET ORAL DAILY
Qty: 30 TABLET | Refills: 5 | Status: SHIPPED | OUTPATIENT
Start: 2024-05-28

## 2024-05-28 RX ORDER — METHOCARBAMOL 500 MG/1
500 TABLET, FILM COATED ORAL DAILY PRN
Qty: 30 TABLET | Refills: 0 | Status: SHIPPED | OUTPATIENT
Start: 2024-05-28

## 2024-05-28 RX ORDER — DEXTROAMPHETAMINE SACCHARATE, AMPHETAMINE ASPARTATE MONOHYDRATE, DEXTROAMPHETAMINE SULFATE AND AMPHETAMINE SULFATE 2.5; 2.5; 2.5; 2.5 MG/1; MG/1; MG/1; MG/1
20 CAPSULE, EXTENDED RELEASE ORAL EVERY MORNING
Qty: 60 CAPSULE | Refills: 0 | Status: SHIPPED | OUTPATIENT
Start: 2024-05-28

## 2024-06-24 DIAGNOSIS — F90.0 ATTENTION DEFICIT HYPERACTIVITY DISORDER (ADHD), PREDOMINANTLY INATTENTIVE TYPE: ICD-10-CM

## 2024-06-24 DIAGNOSIS — M54.2 NECK PAIN, CHRONIC: ICD-10-CM

## 2024-06-24 DIAGNOSIS — G89.29 NECK PAIN, CHRONIC: ICD-10-CM

## 2024-06-25 RX ORDER — DEXTROAMPHETAMINE SACCHARATE, AMPHETAMINE ASPARTATE MONOHYDRATE, DEXTROAMPHETAMINE SULFATE AND AMPHETAMINE SULFATE 2.5; 2.5; 2.5; 2.5 MG/1; MG/1; MG/1; MG/1
20 CAPSULE, EXTENDED RELEASE ORAL EVERY MORNING
Qty: 60 CAPSULE | Refills: 0 | Status: SHIPPED | OUTPATIENT
Start: 2024-06-25

## 2024-06-25 RX ORDER — METHOCARBAMOL 500 MG/1
TABLET, FILM COATED ORAL
Qty: 30 TABLET | Refills: 0 | Status: SHIPPED | OUTPATIENT
Start: 2024-06-25

## 2024-07-19 ENCOUNTER — OFFICE VISIT (OUTPATIENT)
Dept: FAMILY MEDICINE CLINIC | Facility: CLINIC | Age: 49
End: 2024-07-19
Payer: COMMERCIAL

## 2024-07-19 ENCOUNTER — OFFICE VISIT (OUTPATIENT)
Dept: OBGYN CLINIC | Facility: CLINIC | Age: 49
End: 2024-07-19
Payer: OTHER MISCELLANEOUS

## 2024-07-19 VITALS
SYSTOLIC BLOOD PRESSURE: 138 MMHG | TEMPERATURE: 99.3 F | BODY MASS INDEX: 31.02 KG/M2 | HEART RATE: 86 BPM | WEIGHT: 158 LBS | OXYGEN SATURATION: 100 % | DIASTOLIC BLOOD PRESSURE: 86 MMHG | HEIGHT: 60 IN

## 2024-07-19 VITALS
BODY MASS INDEX: 31.18 KG/M2 | SYSTOLIC BLOOD PRESSURE: 149 MMHG | WEIGHT: 158.8 LBS | HEART RATE: 114 BPM | HEIGHT: 60 IN | DIASTOLIC BLOOD PRESSURE: 95 MMHG

## 2024-07-19 DIAGNOSIS — Z12.11 COLON CANCER SCREENING: ICD-10-CM

## 2024-07-19 DIAGNOSIS — H60.502 ACUTE OTITIS EXTERNA OF LEFT EAR, UNSPECIFIED TYPE: ICD-10-CM

## 2024-07-19 DIAGNOSIS — J45.21 MILD INTERMITTENT ASTHMA WITH ACUTE EXACERBATION: Primary | Chronic | ICD-10-CM

## 2024-07-19 DIAGNOSIS — R03.0 ELEVATED BP WITHOUT DIAGNOSIS OF HYPERTENSION: ICD-10-CM

## 2024-07-19 DIAGNOSIS — J06.9 UPPER RESPIRATORY TRACT INFECTION, UNSPECIFIED TYPE: ICD-10-CM

## 2024-07-19 DIAGNOSIS — F98.8 ATTENTION DEFICIT DISORDER, UNSPECIFIED HYPERACTIVITY PRESENCE: Chronic | ICD-10-CM

## 2024-07-19 DIAGNOSIS — S99.911D RIGHT ANKLE INJURY, SUBSEQUENT ENCOUNTER: Primary | ICD-10-CM

## 2024-07-19 DIAGNOSIS — Z12.31 ENCOUNTER FOR SCREENING MAMMOGRAM FOR MALIGNANT NEOPLASM OF BREAST: ICD-10-CM

## 2024-07-19 PROCEDURE — 99214 OFFICE O/P EST MOD 30 MIN: CPT

## 2024-07-19 PROCEDURE — 99213 OFFICE O/P EST LOW 20 MIN: CPT | Performed by: FAMILY MEDICINE

## 2024-07-19 PROCEDURE — 93000 ELECTROCARDIOGRAM COMPLETE: CPT

## 2024-07-19 RX ORDER — AZITHROMYCIN 250 MG/1
TABLET, FILM COATED ORAL
Qty: 6 TABLET | Refills: 0 | Status: SHIPPED | OUTPATIENT
Start: 2024-07-19 | End: 2024-07-23

## 2024-07-19 RX ORDER — IBUPROFEN 800 MG/1
800 TABLET ORAL 2 TIMES DAILY
Qty: 40 TABLET | Refills: 0 | Status: SHIPPED | OUTPATIENT
Start: 2024-07-19

## 2024-07-19 RX ORDER — PREDNISONE 20 MG/1
20 TABLET ORAL DAILY
Qty: 5 TABLET | Refills: 0 | Status: SHIPPED | OUTPATIENT
Start: 2024-07-19 | End: 2024-07-24

## 2024-07-19 NOTE — ASSESSMENT & PLAN NOTE
- well controlled on current adderall 20 mg QD  - UTD on UDS and controlled substance agreement   - EKG completed today given recent high BP readings which showed NSR

## 2024-07-19 NOTE — ASSESSMENT & PLAN NOTE
- BP last two visits has been borderline high; today 138/86 mmHg   - EKG completed today which showed NSR  - pt thinks elevated BP related to ankle pain which she is seeing ortho for and stress from her relationship and living situation   - advised to get home cuff and monitor daily and keep log of it for the next two weeks  - will follow up for BP check in two weeks; if consistently elevated will discuss starting anti hypertensive agent at that time

## 2024-07-19 NOTE — LETTER
July 19, 2024     Patient: Lima Anglin  YOB: 1975  Date of Visit: 7/19/2024      To Whom it May Concern:    Lima Anglin is under my professional care. January was seen in my office on 7/19/2024.     January may work with restrictions: wear ankle brace at all times.     January is referred for MRI and will be re-evaluated afterward.    If you have any questions or concerns, please don't hesitate to call.         Sincerely,          Donte Leonard DO        CC: No Recipients

## 2024-07-19 NOTE — LETTER
July 19, 2024     Patient: Lima Anglin  YOB: 1975  Date of Visit: 7/19/2024      To Whom it May Concern:    Lima Anglin is under my professional care. January was seen in my office on 7/19/2024. January may return to work on 7/22/24 . Please excuse her from 7/9/24-7/22/24.     If you have any questions or concerns, please don't hesitate to call.         Sincerely,          Lilly Tillman PA-C        CC: No Recipients

## 2024-07-19 NOTE — PROGRESS NOTES
Assessment/Plan:  Assessment & Plan   Diagnoses and all orders for this visit:    Right ankle injury, subsequent encounter  -     MRI ankle/heel right  wo contrast; Future  -     ibuprofen (MOTRIN) 800 mg tablet; Take 1 tablet (800 mg total) by mouth 2 (two) times a day        48-year-old female with onset of right ankle pain from twisting injury outside of work on 3/7/2024.  Symptoms persist for more than 4 months with rest, bracing, NSAIDS, icing.  Will refer for MRI the right ankle to evaluate for stress/occult fracture and osteochondral lesion as invasive management may be warranted.  Follow-up after MRI is done.              Subjective:   Patient ID: Lima Anglin is a 49 y.o. female.  Chief Complaint   Patient presents with    Right Ankle - Follow-up, Swelling        48 year old female following up for right ankle pain onset from injury outside of work on 3/7/2024. She was last seen by me more than 2.5 months ago at which point she was placed in ankle brace and referred to formal therapy. She has not had PT since last visit. Reports pain localized to medial aspect the ankle, nonradiating, achy and throbbing and burning, worse with prolonged standing and ambulation, associated with swelling, and improved with resting. Footwear that places direct contact on medial malleolus is quite bothersome. Pain at the lateral ankle no longer bothersome. She has been icing and taking Ibuprofen to help with symptoms.    Ankle Pain  This is a new problem. The current episode started more than 1 month ago. The problem occurs daily. The problem has been unchanged. Associated symptoms include arthralgias and joint swelling. Pertinent negatives include no numbness or weakness. The symptoms are aggravated by standing and walking. She has tried rest, position changes, NSAIDs and ice for the symptoms. The treatment provided mild relief.               Review of Systems   Musculoskeletal:  Positive for arthralgias and joint  swelling.   Neurological:  Negative for weakness and numbness.       Objective:  Vitals:    07/19/24 1403   BP: 149/95   Pulse: (!) 114   Weight: 72 kg (158 lb 12.8 oz)   Height: 5' (1.524 m)      Right Ankle Exam   Swelling: mild (medial)    Range of Motion   Dorsiflexion:  5   Plantar flexion:  normal   Eversion:  normal   Inversion:  10     Muscle Strength   Dorsiflexion:  5/5  Plantar flexion:  5/5    Other   Sensation: normal  Pulse: present           Observations     Right Ankle/Foot   Negative for deformity.     Tenderness     Right Ankle/Foot   Tenderness in the medial malleolus. No tenderness in the Achilles insertion, anterior ankle, anterior talofibular ligament, fifth metatarsal base, calcaneofibular ligament, dorsum foot, lateral malleolus, navicular, peroneal tendon, plantar fascia, posterior tibial tendon, posterior talofibular ligament, proximal Achilles and talar dome.     Strength/Myotome Testing     Right Ankle/Foot   Dorsiflexion: 5  Plantar flexion: 5  Inversion: 5  Eversion: 5    Tests     Right Ankle/Foot   Negative for anterior drawer, calcaneal squeeze, metatarsal squeeze, posterior drawer, syndesmosis squeeze and syndesmosis external rotation.       Physical Exam  Vitals and nursing note reviewed.   Constitutional:       Appearance: Normal appearance. She is well-developed. She is not ill-appearing or diaphoretic.   HENT:      Head: Normocephalic and atraumatic.      Right Ear: External ear normal.      Left Ear: External ear normal.   Eyes:      Conjunctiva/sclera: Conjunctivae normal.   Neck:      Trachea: No tracheal deviation.   Cardiovascular:      Comments: Tachycardic  Pulmonary:      Effort: Pulmonary effort is normal. No respiratory distress.   Abdominal:      General: There is no distension.   Musculoskeletal:         General: Swelling and tenderness present.      Right ankle: Tenderness present over the medial malleolus. No lateral malleolus, ATF ligament, CF ligament, posterior  TF ligament or base of 5th metatarsal tenderness. Anterior drawer test negative.      Right foot: No deformity.   Skin:     General: Skin is warm and dry.      Coloration: Skin is not jaundiced or pale.   Neurological:      Mental Status: She is alert and oriented to person, place, and time.   Psychiatric:         Mood and Affect: Mood normal.         Behavior: Behavior normal.         Thought Content: Thought content normal.         Judgment: Judgment normal.

## 2024-07-19 NOTE — PROGRESS NOTES
Ambulatory Visit  Name: Lima Anglin      : 1975      MRN: [id number]  Encounter Provider: Lilly Tillman PA-C  Encounter Date: 2024   Encounter department: [facility]    Assessment & Plan   1. Mild intermittent asthma with acute exacerbation  Assessment & Plan:  - pt has been sick for about two weeks (home test for covid was positive last week), continuous productive cough and wheezing with increased use of albuterol inhaler   - on exam, slight expiratory wheezing heard; O2 100% on room air   - treating for asthma exacerbation with prednisone burst (20 mg x 5 days) and azithromycin; discussed side effects of both medications   - pt concerned she has pneumonia; low suspicion given normal lung sounds however ordered CXR to rule out given her concern   - otherwise encouraged supportive care and rest  - advised to call if symptoms persist/worsen despite tx   - ER if she develop any chest pain or sob   - provided work note   Orders:  -     predniSONE 20 mg tablet; Take 1 tablet (20 mg total) by mouth daily for 5 days  -     azithromycin (ZITHROMAX) 250 mg tablet; Take 2 tablets today then 1 tablet daily x 4 days  -     XR chest pa & lateral; Future; Expected date: 2024  2. Upper respiratory tract infection, unspecified type  -     azithromycin (ZITHROMAX) 250 mg tablet; Take 2 tablets today then 1 tablet daily x 4 days  3. Acute otitis externa of left ear, unspecified type  Assessment & Plan:  - left ear pain x 1 week since covid   - on exam, inner ear canal erythematous and swollen with tenderness to palpation of tragus  - therefore treating for OE with cortisporin drops q6hr x 7 days   - advised to call if symptoms persist or worsen despite tx   Orders:  -     neomycin-polymyxin-hydrocortisone (CORTISPORIN) otic solution; Administer 4 drops into the left ear every 6 (six) hours for 7 days  4. Attention deficit disorder, unspecified hyperactivity presence  Assessment &  Plan:  - well controlled on current adderall 20 mg QD  - UTD on UDS and controlled substance agreement   - EKG completed today given recent high BP readings which showed NSR   Orders:  -     POCT ECG  5. Elevated BP without diagnosis of hypertension  Assessment & Plan:  - BP last two visits has been borderline high; today 138/86 mmHg   - EKG completed today which showed NSR  - pt thinks elevated BP related to ankle pain which she is seeing ortho for and stress from her relationship and living situation   - advised to get home cuff and monitor daily and keep log of it for the next two weeks  - will follow up for BP check in two weeks; if consistently elevated will discuss starting anti hypertensive agent at that time   6. Encounter for screening mammogram for malignant neoplasm of breast  -     Mammo screening bilateral w 3d & cad; Future  7. Colon cancer screening  -     Cologuard         History of Present Illness     CC: ADD follow up; sick     Patient presents for ADD follow up. Patient currently on adderall 20 mg QD. She feels her symptoms are well controlled on current dosage and she is doing well on the medication with no reportable side effects.     Patient also reports she has been sick for about two weeks now. Reports tested positive for covid a week ago. Is feeling better however still feels congestion in her chest with productive cough and has been having to use her inhaler more for her asthma. No fevers. Also reports left ear pain; has chronic issues with this ear. Needs note for work.       Review of Systems   Constitutional:  Negative for chills, diaphoresis and fever.   HENT:  Positive for congestion, ear pain (left), rhinorrhea and sinus pressure. Negative for sore throat.    Respiratory:  Positive for cough and wheezing. Negative for chest tightness and shortness of breath.    Cardiovascular:  Negative for chest pain and palpitations.   Neurological:  Negative for headaches.     Past Medical  History:   Diagnosis Date    ADHD     Allergic     Asthma     Depression     Diverticulitis of colon     Obesity      Past Surgical History:   Procedure Laterality Date    BREAST SURGERY  2012     Family History   Problem Relation Age of Onset    Depression Mother     Diabetes Mother     ADD / ADHD Mother     Anxiety disorder Mother     Breast cancer Maternal Aunt      Social History     Tobacco Use    Smoking status: Never    Smokeless tobacco: Never   Vaping Use    Vaping status: Never Used   Substance and Sexual Activity    Alcohol use: Not Currently     Comment: social    Drug use: Never    Sexual activity: Yes     Partners: Male     Birth control/protection: Male Sterilization     Current Outpatient Medications on File Prior to Visit   Medication Sig    albuterol (PROVENTIL HFA,VENTOLIN HFA) 90 mcg/act inhaler INHALE 2 PUFFS EVERY FOUR HOURS AS NEEDED FOR WHEEZING    amphetamine-dextroamphetamine (ADDERALL XR) 10 MG 24 hr capsule take 2 capsules by mouth every morning    azelastine (ASTELIN) 0.1 % nasal spray 1 spray into each nostril 2 (two) times a day Use in each nostril as directed    calcium carbonate-vitamin D 250 mg-3.125 mcg per tablet Take 1 tablet by mouth daily    cetirizine (ZyrTEC) 10 mg tablet Take 1 tablet (10 mg total) by mouth daily    EPINEPHrine (EPIPEN) 0.3 mg/0.3 mL SOAJ inject 0.3 milliliters intramuscularly ONE TIME for 1 dose    FLUoxetine (PROzac) 20 mg capsule Take 1 capsule (20 mg total) by mouth daily    fluticasone (FLONASE) 50 mcg/act nasal spray 2 sprays into each nostril daily    ibuprofen (MOTRIN) 600 mg tablet Take 1 tablet (600 mg total) by mouth every 6 (six) hours as needed for mild pain or moderate pain for up to 7 days    ibuprofen (MOTRIN) 800 mg tablet Take 1 tablet (800 mg total) by mouth 2 (two) times a day    methocarbamol (ROBAXIN) 500 mg tablet take 1 tablet by mouth once daily if needed for muscle spasm    montelukast (SINGULAIR) 10 mg tablet Take 1 tablet (10 mg  total) by mouth daily at bedtime    SUMAtriptan (IMITREX) 25 mg tablet Take 1 tablet (25 mg total) by mouth once as needed for migraine for up to 1 dose     Allergies   Allergen Reactions    Codeine Edema     Swelling    Dog Epithelium Sneezing     sneezing    Lactose Intolerance (Gi) - Food Allergy Diarrhea    Peanut Oil - Food Allergy Diarrhea     Immunization History   Administered Date(s) Administered    COVID-19 MODERNA VACC 0.5 ML IM 01/12/2021, 02/27/2021    INFLUENZA 01/01/2007, 10/09/2012, 10/22/2014, 09/28/2015, 11/03/2016, 10/10/2017, 11/05/2018, 06/29/2019, 10/09/2019, 10/11/2020, 11/11/2022, 10/17/2023    Pneumococcal Conjugate Vaccine 20-valent (Pcv20), Polysace 11/11/2022    Pneumococcal Polysaccharide PPV23 04/29/2015    Tdap 11/23/2009, 02/04/2019     Objective     /86   Pulse 86   Temp 99.3 °F (37.4 °C)   Ht 5' (1.524 m)   Wt 71.7 kg (158 lb)   SpO2 100%   BMI 30.86 kg/m²     Physical Exam  Vitals reviewed.   Constitutional:       General: She is not in acute distress.     Appearance: Normal appearance. She is not ill-appearing or diaphoretic.   HENT:      Head: Normocephalic and atraumatic.      Right Ear: Tympanic membrane, ear canal and external ear normal. There is no impacted cerumen.      Left Ear: Tympanic membrane and external ear normal. Swelling (inner ear swollen and erythematous) and tenderness (to palpation of tragus) present.      Nose: Congestion present. No rhinorrhea.      Mouth/Throat:      Mouth: Mucous membranes are moist.      Pharynx: Oropharynx is clear. No oropharyngeal exudate or posterior oropharyngeal erythema.   Eyes:      General:         Right eye: No discharge.         Left eye: No discharge.      Conjunctiva/sclera: Conjunctivae normal.   Cardiovascular:      Rate and Rhythm: Normal rate and regular rhythm.      Pulses: Normal pulses.      Heart sounds: Normal heart sounds. No murmur heard.  Pulmonary:      Effort: Pulmonary effort is normal. No  respiratory distress.      Breath sounds: Wheezing (slight expiratory wheezing) present. No rhonchi or rales.   Musculoskeletal:         General: Normal range of motion.      Cervical back: Normal range of motion and neck supple.      Right lower leg: No edema.      Left lower leg: No edema.   Lymphadenopathy:      Cervical: No cervical adenopathy.   Skin:     General: Skin is warm.   Neurological:      General: No focal deficit present.      Mental Status: She is alert.      Gait: Gait normal.   Psychiatric:         Mood and Affect: Mood normal.

## 2024-07-19 NOTE — ASSESSMENT & PLAN NOTE
- pt has been sick for about two weeks (home test for covid was positive last week), continuous productive cough and wheezing with increased use of albuterol inhaler   - on exam, slight expiratory wheezing heard; O2 100% on room air   - treating for asthma exacerbation with prednisone burst (20 mg x 5 days) and azithromycin; discussed side effects of both medications   - pt concerned she has pneumonia; low suspicion given normal lung sounds however ordered CXR to rule out given her concern   - otherwise encouraged supportive care and rest  - advised to call if symptoms persist/worsen despite tx   - ER if she develop any chest pain or sob   - provided work note

## 2024-07-19 NOTE — ASSESSMENT & PLAN NOTE
- left ear pain x 1 week since covid   - on exam, inner ear canal erythematous and swollen with tenderness to palpation of tragus  - therefore treating for OE with cortisporin drops q6hr x 7 days   - advised to call if symptoms persist or worsen despite tx

## 2024-07-23 ENCOUNTER — TELEPHONE (OUTPATIENT)
Age: 49
End: 2024-07-23

## 2024-07-23 NOTE — TELEPHONE ENCOUNTER
Caller: W/C    Doctor/Office: [place]       What needs to be faxed: Office note from 7/19/24      Fax#: [phone]       Documents were successfully e-faxed

## 2024-07-26 ENCOUNTER — TELEPHONE (OUTPATIENT)
Dept: OBGYN CLINIC | Facility: HOSPITAL | Age: 49
End: 2024-07-26

## 2024-07-26 NOTE — TELEPHONE ENCOUNTER
Caller: Dejon- Work Comp    Doctor: Horacio    Reason for call: Asking if MRI script can be faxed to her.    Fax# [phone]    Call back#: [phone]

## 2024-07-27 DIAGNOSIS — F90.0 ATTENTION DEFICIT HYPERACTIVITY DISORDER (ADHD), PREDOMINANTLY INATTENTIVE TYPE: ICD-10-CM

## 2024-07-27 DIAGNOSIS — M54.2 NECK PAIN, CHRONIC: ICD-10-CM

## 2024-07-27 DIAGNOSIS — G89.29 NECK PAIN, CHRONIC: ICD-10-CM

## 2024-07-29 RX ORDER — DEXTROAMPHETAMINE SACCHARATE, AMPHETAMINE ASPARTATE MONOHYDRATE, DEXTROAMPHETAMINE SULFATE AND AMPHETAMINE SULFATE 2.5; 2.5; 2.5; 2.5 MG/1; MG/1; MG/1; MG/1
20 CAPSULE, EXTENDED RELEASE ORAL EVERY MORNING
Qty: 60 CAPSULE | Refills: 0 | Status: SHIPPED | OUTPATIENT
Start: 2024-07-29

## 2024-07-29 RX ORDER — METHOCARBAMOL 500 MG/1
500 TABLET, FILM COATED ORAL ONCE AS NEEDED
Qty: 15 TABLET | Refills: 0 | Status: SHIPPED | OUTPATIENT
Start: 2024-07-29

## 2024-08-01 DIAGNOSIS — T36.95XA ANTIBIOTIC-INDUCED YEAST INFECTION: Primary | ICD-10-CM

## 2024-08-01 DIAGNOSIS — B37.9 ANTIBIOTIC-INDUCED YEAST INFECTION: Primary | ICD-10-CM

## 2024-08-01 RX ORDER — FLUCONAZOLE 150 MG/1
150 TABLET ORAL ONCE
Qty: 1 TABLET | Refills: 0 | Status: SHIPPED | OUTPATIENT
Start: 2024-08-01 | End: 2024-08-01

## 2024-08-03 DIAGNOSIS — E55.9 VITAMIN D DEFICIENCY: Primary | ICD-10-CM

## 2024-08-04 ENCOUNTER — OFFICE VISIT (OUTPATIENT)
Age: 49
End: 2024-08-04
Payer: COMMERCIAL

## 2024-08-04 VITALS
WEIGHT: 158.2 LBS | TEMPERATURE: 98.1 F | OXYGEN SATURATION: 96 % | SYSTOLIC BLOOD PRESSURE: 142 MMHG | HEART RATE: 91 BPM | HEIGHT: 65 IN | BODY MASS INDEX: 26.36 KG/M2 | RESPIRATION RATE: 20 BRPM | DIASTOLIC BLOOD PRESSURE: 93 MMHG

## 2024-08-04 DIAGNOSIS — T78.40XA ALLERGIC REACTION, INITIAL ENCOUNTER: Primary | ICD-10-CM

## 2024-08-04 PROCEDURE — 96372 THER/PROPH/DIAG INJ SC/IM: CPT | Performed by: EMERGENCY MEDICINE

## 2024-08-04 PROCEDURE — G0382 LEV 3 HOSP TYPE B ED VISIT: HCPCS | Performed by: EMERGENCY MEDICINE

## 2024-08-04 RX ORDER — EPINEPHRINE 0.3 MG/.3ML
0.3 INJECTION SUBCUTANEOUS ONCE
Qty: 0.6 ML | Refills: 0 | Status: SHIPPED | OUTPATIENT
Start: 2024-08-04 | End: 2024-08-04

## 2024-08-04 RX ORDER — PREDNISONE 10 MG/1
TABLET ORAL
Qty: 27 TABLET | Refills: 0 | Status: SHIPPED | OUTPATIENT
Start: 2024-08-04

## 2024-08-04 RX ORDER — EPINEPHRINE 0.3 MG/.3ML
INJECTION SUBCUTANEOUS
Refills: 0 | OUTPATIENT
Start: 2024-08-04

## 2024-08-04 RX ORDER — DIPHENHYDRAMINE HYDROCHLORIDE 50 MG/ML
25 INJECTION INTRAMUSCULAR; INTRAVENOUS ONCE
Status: COMPLETED | OUTPATIENT
Start: 2024-08-04 | End: 2024-08-04

## 2024-08-04 RX ADMIN — DIPHENHYDRAMINE HYDROCHLORIDE 25 MG: 50 INJECTION INTRAMUSCULAR; INTRAVENOUS at 13:34

## 2024-08-04 NOTE — PROGRESS NOTES
"[facility]        NAME: Lima Anglin is a 49 y.o. female  : 1975    MRN: [id number]  DATE: 2024  TIME: 1:51 PM    Assessment and Plan   Allergic reaction, initial encounter [T78.40XA]  1. Allergic reaction, initial encounter  diphenhydrAMINE (BENADRYL) injection 25 mg    predniSONE 10 mg tablet    EPINEPHrine (EPIPEN) 0.3 mg/0.3 mL SOAJ            Patient Instructions     Patient Instructions   Use ice pack or cold compresses to avoid scratching  Take an H1 antihistamine like Benadryl or non-sedating antihistamine like Zyrtec, Allegra or Claritin, AND an H2 antihistamine like Pepcid or Zantac for itch.  If you are diabetic you should adhere strictly to your diabetic diet and monitor blood sugar closely while on prednisone and you should discontinue the prednisone if blood sugar becomes significantly elevated.  Avoid nonsteroidal anti-inflammatories like Advil or Aleve while on prednisone.   Patient Education     Angioedema   The Basics   Written by the doctors and editors at [facility]   What is angioedema? -- This is a condition that causes puffiness in the tissue under the skin. It can involve swelling of the face, eyelids, ears, mouth, tongue, hands, feet, or genitals (picture 1 and picture 2).  Some people who get angioedema also get hives (figure 1). Hives are raised patches of skin that are very itchy (picture 3).  Different things can cause angioedema. Sometimes, it is a sign of a serious allergic reaction.  Why did I get angioedema? -- Certain medicines can cause angioedema, including:   Medicines called \"ACE inhibitors\" - These are used to treat high blood pressure or heart disease. They include enalapril, captopril, and lisinopril. People who get angioedema because of these medicines usually don't have hives or itching.   Over-the-counter medicines for pain and fever - These include NSAID medicines such as aspirin, ibuprofen (sample brand names: Motrin, Advil), and naproxen " "(sample brand name: Aleve).   Antibiotics - People who get angioedema because of antibiotics usually also have hives, trouble breathing, and other symptoms of an allergic reaction.  Another cause of angioedema is an allergic reaction. If you just got angioedema for the first time, it might be because you have a new allergy to something. Allergic reactions to these things can cause angioedema:   Insect stings   Foods, such as eggs, nuts, fish, or shellfish   Something you touched, such as a plant, animal saliva, or latex   Exercise  Allergic reactions usually have other symptoms in addition to angioedema. These include hives, trouble breathing, and other problems.  Angioedema can also be caused by rare diseases that sometimes run in families. An example is \"hereditary angioedema.\" This disease causes repeated attacks of angioedema, belly pain, or swelling in the throat. These attacks last 2 to 5 days and then get better. The disease is serious because swelling in the throat can cut off the air supply. If you and other people in your family have angioedema, see a doctor. There is testing and treatment for hereditary angioedema.  Sometimes, doctors don't know the cause of angioedema.  Is there a test for angioedema? -- It depends. There are tests for angioedema caused by allergies and for hereditary angioedema. But there are no tests for most of the other causes of angioedema. Your doctor or nurse can often tell if you have angioedema by learning about your symptoms and asking questions.  How is angioedema treated? -- The treatment depends on the cause and how serious your symptoms are:   If you get angioedema because of a dangerous allergic reaction, you will need to be treated in a hospital right away. At the hospital, the staff will give you treatments to stop the allergic reaction and help your symptoms.   If your symptoms are mild, you might not need treatment. But you should try to figure out if anything " triggered your symptoms. If so, you need to avoid that trigger.   Your doctor might recommend that you take medicines called antihistamines. These are the same medicines that people take for seasonal allergies.   Your doctor might also prescribe medicines called steroids. Steroids can help with itching and reduce swelling. But you should not take steroids for any longer than you need them, because they can cause serious side effects.   If you got angioedema because of a medicine, your doctor will switch you to a different medicine.  Can angioedema be prevented? -- You can lower your chances of getting angioedema by avoiding foods, medicines, or insects that make you have an allergic reaction. If you get angioedema a lot, your doctor might recommend taking antihistamines every day.  When should I call for help? -- Call for emergency help (in the [place] and [place], call 9-1-1) if you suddenly have puffiness or hives plus any of the following:   Trouble breathing   Tightness in your throat   Trouble swallowing your saliva   Nausea and vomiting   Cramps or stomach pain   Passing out  These symptoms can be signs of a serious allergic reaction.  All topics are updated as new evidence becomes available and our peer review process is complete.  This topic retrieved from [employer] on: Mar 09, 2024.  Topic 11975 Version 14.0  Release: 32.2.4 - C32.67  © 2024 UpToDate, Inc. and/or its affiliates. All rights reserved.  picture 1: Angioedema of the lips and tongue     Angioedema causes swelling or puffiness, often in the face.  (A) Angioedema of the lips  (B) Angioedema on 1 side of the tongue  Graphic 29385 Version 8.0  picture 2: Angioedema     Angioedema causes puffiness and swelling of the tissues under the skin. This picture shows angioedema of the lips.  Graphic 470125 Version 2.0  figure 1: Hives and angioedema on the face     This person has hives as well as facial angioedema (swelling).  Graphic 206195 Version 1.0  picture  3: Hives     Hives are raised patches of skin that are usually very itchy. They usually come and go within a few hours, but they can show up again and again in some people.  Graphic 62633 Version 8.0  Consumer Information Use and Disclaimer   Disclaimer: This generalized information is a limited summary of diagnosis, treatment, and/or medication information. It is not meant to be comprehensive and should be used as a tool to help the user understand and/or assess potential diagnostic and treatment options. It does NOT include all information about conditions, treatments, medications, side effects, or risks that may apply to a specific patient. It is not intended to be medical advice or a substitute for the medical advice, diagnosis, or treatment of a health care provider based on the health care provider's examination and assessment of a patient's specific and unique circumstances. Patients must speak with a health care provider for complete information about their health, medical questions, and treatment options, including any risks or benefits regarding use of medications. This information does not endorse any treatments or medications as safe, effective, or approved for treating a specific patient. UpToDate, Inc. and its affiliates disclaim any warranty or liability relating to this information or the use thereof.The use of this information is governed by the Terms of Use, available at https://www.woltersHCDCuwer.com/en/know/clinical-effectiveness-terms. 2024© UpToDate, Inc. and its affiliates and/or licensors. All rights reserved.  Copyright   © 2024 UpToDate, Inc. and/or its affiliates. All rights reserved.      Follow up with PCP in 3-5 days.  Proceed to  ER if symptoms worsen.    Chief Complaint     Chief Complaint   Patient presents with    Allergic Reaction     Allergy to tide detergent, facial swelling started Saturday and swelling seemed to clear. Epipen used x2 half hour apart. Swelling, burning, itchiness  and pressure returned yesterday. Noticed that dose had  in .         History of Present Illness       Patient complains of swelling of lips for the past 2 days.  Patient self administered EpiPen twice 2 days ago.  She has also been taking Benadryl by mouth.  Has history of similar allergic reactions in the past usually due to Tide laundry detergent.  She just learned that her boyfriend uses Tide laundry detergent so she was unknowingly exposed to this at his house 2 days ago.  She denies any throat or respiratory symptoms.        Review of Systems   Review of Systems   Constitutional:  Negative for appetite change, chills, fatigue and fever.   HENT:  Negative for congestion, rhinorrhea, sinus pressure, sore throat, trouble swallowing and voice change.    Respiratory:  Negative for cough, chest tightness, shortness of breath and wheezing.    Cardiovascular:  Negative for chest pain.   Gastrointestinal:  Negative for nausea.   Musculoskeletal:  Negative for myalgias.   Skin:  Positive for color change and rash.         Current Medications       Current Outpatient Medications:     albuterol (PROVENTIL HFA,VENTOLIN HFA) 90 mcg/act inhaler, INHALE 2 PUFFS EVERY FOUR HOURS AS NEEDED FOR WHEEZING, Disp: , Rfl:     amphetamine-dextroamphetamine (ADDERALL XR) 10 MG 24 hr capsule, Take 2 capsules (20 mg total) by mouth every morning, Disp: 60 capsule, Rfl: 0    azelastine (ASTELIN) 0.1 % nasal spray, 1 spray into each nostril 2 (two) times a day Use in each nostril as directed, Disp: , Rfl:     calcium carbonate-vitamin D 250 mg-3.125 mcg per tablet, Take 1 tablet by mouth daily, Disp: , Rfl:     cetirizine (ZyrTEC) 10 mg tablet, Take 1 tablet (10 mg total) by mouth daily, Disp: 30 tablet, Rfl: 5    EPINEPHrine (EPIPEN) 0.3 mg/0.3 mL SOAJ, inject 0.3 milliliters intramuscularly ONE TIME for 1 dose, Disp: , Rfl:     EPINEPHrine (EPIPEN) 0.3 mg/0.3 mL SOAJ, Inject 0.3 mL (0.3 mg total) into a muscle once for 1 dose,  Disp: 0.6 mL, Rfl: 0    FLUoxetine (PROzac) 20 mg capsule, Take 1 capsule (20 mg total) by mouth daily, Disp: 30 capsule, Rfl: 5    fluticasone (FLONASE) 50 mcg/act nasal spray, 2 sprays into each nostril daily, Disp: , Rfl:     ibuprofen (MOTRIN) 800 mg tablet, Take 1 tablet (800 mg total) by mouth 2 (two) times a day, Disp: 40 tablet, Rfl: 0    methocarbamol (ROBAXIN) 500 mg tablet, Take 1 tablet (500 mg total) by mouth once as needed for muscle spasms, Disp: 15 tablet, Rfl: 0    montelukast (SINGULAIR) 10 mg tablet, Take 1 tablet (10 mg total) by mouth daily at bedtime, Disp: 30 tablet, Rfl: 5    predniSONE 10 mg tablet, Take once daily all days pills on this schedule 6- 6- 5- 4- 3- 2- 1, Disp: 27 tablet, Rfl: 0    SUMAtriptan (IMITREX) 25 mg tablet, Take 1 tablet (25 mg total) by mouth once as needed for migraine for up to 1 dose, Disp: 30 tablet, Rfl: 0    ibuprofen (MOTRIN) 600 mg tablet, Take 1 tablet (600 mg total) by mouth every 6 (six) hours as needed for mild pain or moderate pain for up to 7 days, Disp: 28 tablet, Rfl: 0    neomycin-polymyxin-hydrocortisone (CORTISPORIN) otic solution, Administer 4 drops into the left ear every 6 (six) hours for 7 days, Disp: 10 mL, Rfl: 0  No current facility-administered medications for this visit.    Current Allergies     Allergies as of 08/04/2024 - Reviewed 08/04/2024   Allergen Reaction Noted    Codeine Edema 06/03/2020    Dog epithelium Sneezing 04/28/2015    Lactose intolerance (gi) - food allergy Diarrhea 02/01/2004    Peanut oil - food allergy Diarrhea 04/28/2015            The following portions of the patient's history were reviewed and updated as appropriate: allergies, current medications, past family history, past medical history, past social history, past surgical history and problem list.     Past Medical History:   Diagnosis Date    ADHD     Allergic     Asthma     Depression     Diverticulitis of colon     Obesity        Past Surgical History:  "  Procedure Laterality Date    BREAST SURGERY  2012       Family History   Problem Relation Age of Onset    Depression Mother     Diabetes Mother     ADD / ADHD Mother     Anxiety disorder Mother     No Known Problems Father     Breast cancer Maternal Aunt          Medications have been verified.        Objective   /93   Pulse 91   Temp 98.1 °F (36.7 °C)   Resp 20   Ht 5' 5\" (1.651 m)   Wt 71.8 kg (158 lb 3.2 oz)   LMP 08/01/2024   SpO2 96%   BMI 26.33 kg/m²        Physical Exam     Physical Exam  Vitals and nursing note reviewed.   Constitutional:       General: She is not in acute distress.     Appearance: She is well-developed. She is not ill-appearing or toxic-appearing.   HENT:      Head: Normocephalic and atraumatic.      Right Ear: External ear normal.      Left Ear: External ear normal.      Nose: Mucosal edema present. No congestion.      Mouth/Throat:      Pharynx: No oropharyngeal exudate or posterior oropharyngeal erythema.      Tonsils: No tonsillar abscesses.   Cardiovascular:      Rate and Rhythm: Normal rate and regular rhythm.   Pulmonary:      Effort: Pulmonary effort is normal. No respiratory distress.      Breath sounds: No wheezing, rhonchi or rales.   Musculoskeletal:      Cervical back: Neck supple.   Skin:     General: Skin is warm and dry.      Coloration: Skin is not pale.      Findings: Rash present.      Comments: Mild swelling of lips, no swelling of tongue.   Neurological:      Mental Status: She is alert and oriented to person, place, and time.   Psychiatric:         Mood and Affect: Mood normal.         Behavior: Behavior normal.         Thought Content: Thought content normal.         Judgment: Judgment normal.                   "

## 2024-08-04 NOTE — PATIENT INSTRUCTIONS
"Use ice pack or cold compresses to avoid scratching  Take an H1 antihistamine like Benadryl or non-sedating antihistamine like Zyrtec, Allegra or Claritin, AND an H2 antihistamine like Pepcid or Zantac for itch.  If you are diabetic you should adhere strictly to your diabetic diet and monitor blood sugar closely while on prednisone and you should discontinue the prednisone if blood sugar becomes significantly elevated.  Avoid nonsteroidal anti-inflammatories like Advil or Aleve while on prednisone.   Patient Education     Angioedema   The Basics   Written by the doctors and editors at [facility]   What is angioedema? -- This is a condition that causes puffiness in the tissue under the skin. It can involve swelling of the face, eyelids, ears, mouth, tongue, hands, feet, or genitals (picture 1 and picture 2).  Some people who get angioedema also get hives (figure 1). Hives are raised patches of skin that are very itchy (picture 3).  Different things can cause angioedema. Sometimes, it is a sign of a serious allergic reaction.  Why did I get angioedema? -- Certain medicines can cause angioedema, including:   Medicines called \"ACE inhibitors\" - These are used to treat high blood pressure or heart disease. They include enalapril, captopril, and lisinopril. People who get angioedema because of these medicines usually don't have hives or itching.   Over-the-counter medicines for pain and fever - These include NSAID medicines such as aspirin, ibuprofen (sample brand names: Motrin, Advil), and naproxen (sample brand name: Aleve).   Antibiotics - People who get angioedema because of antibiotics usually also have hives, trouble breathing, and other symptoms of an allergic reaction.  Another cause of angioedema is an allergic reaction. If you just got angioedema for the first time, it might be because you have a new allergy to something. Allergic reactions to these things can cause angioedema:   Insect stings   Foods, such as " "eggs, nuts, fish, or shellfish   Something you touched, such as a plant, animal saliva, or latex   Exercise  Allergic reactions usually have other symptoms in addition to angioedema. These include hives, trouble breathing, and other problems.  Angioedema can also be caused by rare diseases that sometimes run in families. An example is \"hereditary angioedema.\" This disease causes repeated attacks of angioedema, belly pain, or swelling in the throat. These attacks last 2 to 5 days and then get better. The disease is serious because swelling in the throat can cut off the air supply. If you and other people in your family have angioedema, see a doctor. There is testing and treatment for hereditary angioedema.  Sometimes, doctors don't know the cause of angioedema.  Is there a test for angioedema? -- It depends. There are tests for angioedema caused by allergies and for hereditary angioedema. But there are no tests for most of the other causes of angioedema. Your doctor or nurse can often tell if you have angioedema by learning about your symptoms and asking questions.  How is angioedema treated? -- The treatment depends on the cause and how serious your symptoms are:   If you get angioedema because of a dangerous allergic reaction, you will need to be treated in a hospital right away. At the hospital, the staff will give you treatments to stop the allergic reaction and help your symptoms.   If your symptoms are mild, you might not need treatment. But you should try to figure out if anything triggered your symptoms. If so, you need to avoid that trigger.   Your doctor might recommend that you take medicines called antihistamines. These are the same medicines that people take for seasonal allergies.   Your doctor might also prescribe medicines called steroids. Steroids can help with itching and reduce swelling. But you should not take steroids for any longer than you need them, because they can cause serious side " effects.   If you got angioedema because of a medicine, your doctor will switch you to a different medicine.  Can angioedema be prevented? -- You can lower your chances of getting angioedema by avoiding foods, medicines, or insects that make you have an allergic reaction. If you get angioedema a lot, your doctor might recommend taking antihistamines every day.  When should I call for help? -- Call for emergency help (in the [place] and [place], call 9-1-1) if you suddenly have puffiness or hives plus any of the following:   Trouble breathing   Tightness in your throat   Trouble swallowing your saliva   Nausea and vomiting   Cramps or stomach pain   Passing out  These symptoms can be signs of a serious allergic reaction.  All topics are updated as new evidence becomes available and our peer review process is complete.  This topic retrieved from [employer] on: Mar 09, 2024.  Topic 92491 Version 14.0  Release: 32.2.4 - C32.67  © 2024 UpToDate, Inc. and/or its affiliates. All rights reserved.  picture 1: Angioedema of the lips and tongue     Angioedema causes swelling or puffiness, often in the face.  (A) Angioedema of the lips  (B) Angioedema on 1 side of the tongue  Graphic 26550 Version 8.0  picture 2: Angioedema     Angioedema causes puffiness and swelling of the tissues under the skin. This picture shows angioedema of the lips.  Graphic [id number] Version 2.0  figure 1: Hives and angioedema on the face     This person has hives as well as facial angioedema (swelling).  Graphic 162679 Version 1.0  picture 3: Hives     Hives are raised patches of skin that are usually very itchy. They usually come and go within a few hours, but they can show up again and again in some people.  Graphic 02799 Version 8.0  Consumer Information Use and Disclaimer   Disclaimer: This generalized information is a limited summary of diagnosis, treatment, and/or medication information. It is not meant to be comprehensive and should be used as a tool to  help the user understand and/or assess potential diagnostic and treatment options. It does NOT include all information about conditions, treatments, medications, side effects, or risks that may apply to a specific patient. It is not intended to be medical advice or a substitute for the medical advice, diagnosis, or treatment of a health care provider based on the health care provider's examination and assessment of a patient's specific and unique circumstances. Patients must speak with a health care provider for complete information about their health, medical questions, and treatment options, including any risks or benefits regarding use of medications. This information does not endorse any treatments or medications as safe, effective, or approved for treating a specific patient. UpToDate, Inc. and its affiliates disclaim any warranty or liability relating to this information or the use thereof.The use of this information is governed by the Terms of Use, available at https://www.Play2Shop.com.com/en/know/clinical-effectiveness-terms. 2024© UpToDate, Inc. and its affiliates and/or licensors. All rights reserved.  Copyright   © 2024 UpToDate, Inc. and/or its affiliates. All rights reserved.

## 2024-08-11 DIAGNOSIS — S99.911D RIGHT ANKLE INJURY, SUBSEQUENT ENCOUNTER: ICD-10-CM

## 2024-08-12 RX ORDER — IBUPROFEN 800 MG/1
800 TABLET, FILM COATED ORAL 2 TIMES DAILY
Qty: 40 TABLET | Refills: 2 | Status: SHIPPED | OUTPATIENT
Start: 2024-08-12

## 2024-08-13 ENCOUNTER — TELEPHONE (OUTPATIENT)
Age: 49
End: 2024-08-13

## 2024-08-13 NOTE — TELEPHONE ENCOUNTER
Caller: Tommie - One call    Doctor/Office: Horacio     CB#: [phone]    What needs to be faxed: Needs an MRI order with Dr. Delacruz and location where patient is to be scheduled at so they can make her appt.    ATTN to: BAYLEE    Fax#: [phone]

## 2024-08-18 PROBLEM — H60.502 ACUTE OTITIS EXTERNA OF LEFT EAR: Status: RESOLVED | Noted: 2024-07-19 | Resolved: 2024-08-18

## 2024-08-22 ENCOUNTER — TELEPHONE (OUTPATIENT)
Dept: OBGYN CLINIC | Facility: HOSPITAL | Age: 49
End: 2024-08-22

## 2024-08-22 NOTE — TELEPHONE ENCOUNTER
Caller: Dejon ZUNIGA    Doctor: Horacio    Reason for call: Advised no follow up or MRI scheduled at this time. Advised patient is to get MRI and then schedule a follow up with the doctor. If MRI is done outside of  will need to bring disc.    Call back#: n/a

## 2024-08-28 ENCOUNTER — OFFICE VISIT (OUTPATIENT)
Dept: OBGYN CLINIC | Facility: CLINIC | Age: 49
End: 2024-08-28
Payer: OTHER MISCELLANEOUS

## 2024-08-28 VITALS
BODY MASS INDEX: 26.29 KG/M2 | TEMPERATURE: 98.5 F | HEART RATE: 72 BPM | DIASTOLIC BLOOD PRESSURE: 82 MMHG | HEIGHT: 65 IN | SYSTOLIC BLOOD PRESSURE: 118 MMHG | WEIGHT: 157.8 LBS | OXYGEN SATURATION: 100 %

## 2024-08-28 DIAGNOSIS — S99.911D RIGHT ANKLE INJURY, SUBSEQUENT ENCOUNTER: ICD-10-CM

## 2024-08-28 DIAGNOSIS — S82.54XP: Primary | ICD-10-CM

## 2024-08-28 PROCEDURE — 99214 OFFICE O/P EST MOD 30 MIN: CPT | Performed by: STUDENT IN AN ORGANIZED HEALTH CARE EDUCATION/TRAINING PROGRAM

## 2024-08-28 NOTE — PROGRESS NOTES
1. Nondisplaced fracture of medial malleolus of right tibia, subsequent encounter for closed fracture with malunion  Ambulatory Referral to Orthopedic Surgery      2. Right ankle injury, subsequent encounter  Ambulatory Referral to Orthopedic Surgery        Orders Placed This Encounter   Procedures    Ambulatory Referral to Orthopedic Surgery        Imaging Studies (I personally reviewed images in PACS and report):    MRI right ankle without contrast 8/20/2024: [facility] open [facility].  Patient was noted to have a nonunion of the medial malleolus fracture with minimal adjacent marrow edema on both sides of the fracture line. (Will scan in official report to chart)    X-ray right ankle 3/11/2024: No convincing evidence of acute fracture. Probable small chronic avulsion fragment or accessory ossicle adjacent to the lateral malleolar tip. Mild degenerative arthritis at the ankle     IMPRESSION:  Chronic right medial ankle pain secondary to ankle twisting event outside of work  Radiographic imaging noting well-corticated osseous fragment at the tip of the lateral malleolus as well mild degenerative arthritis of the ankle  Treated as an ankle sprain/contusion injury with conservative treatments, ankle bracing, physical therapy/home exercise program and has been weightbearing with use of ankle bracing/compression however continued to have medial sided ankle pain and sensitivity to palpation despite these interventions.  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 I have discussed with the patient the pathophysiology of this diagnosis and reviewed how the examination correlates with this diagnosis.    Prior imaging reviewed as noted above.  Patient did bring a CD of her MRI and my office had to obtain the official  "imaging report from the site of where she had the MRI as noted above.  Discussed the findings of a medial malleoli are nondisplaced fracture, non-union. Counseled given the chronicity of this injury since march, 2024 that her options are to try conservative management through NWB/CAM boot and use of bone stimulator. Alternatively, she could be referred to orthopedic surgery in regards to intervention as well.  Patient prefers to treat surgically at this point and a referral was placed today to Dr. Hussein.  In the interim, patient declined need for a cam boot as she had already been using a boot previously and felt that it did improve her pain while weightbearing.  Work accommodations note update today as per communications.  In regards to pain control I counseled as needed use of acetaminophen, NSAIDs, heat/ice therapy 20 minutes on/off, elevation of the affected extremity.      Return for Follow up with Dr. Hussein from orthopedic surgery.    Portions of the record may have been created with voice recognition software. Occasional wrong word or \"sound a like\" substitutions may have occurred due to the inherent limitations of voice recognition software. Read the chart carefully and recognize, using context, where substitutions have occurred.     I have spent a total time of 40 minutes in caring for this patient on the day of the visit/encounter including Diagnostic results, Prognosis, Risks and benefits of tx options, Instructions for management, Patient and family education, Importance of tx compliance, Risk factor reductions, Impressions, Counseling / Coordination of care, Documenting in the medical record, Reviewing / ordering tests, medicine, procedures  , and Obtaining or reviewing history  .      CHIEF COMPLAINT:  Right ankle injury follow up    HPI:  Lima Anglin is a 49 y.o. female  who presents for       Visit 8/28/2024:  Initial evaluation of right ankle pain/injury:  Workmen's Comp. " evaluation  Patient has been seeing one of my colleagues Dr. Leonard previously in regards to this injury that occurred on 3/7/2024.  Reportedly precipitating injury occurred outside of work after twisting her ankle.  She reports after this event she had immediate pain, progressive swelling of her ankle but was able to limp back to her vehicle.  She denies bruising of her ankle initially.  Patient last seen by sports medicine on 7/19/2024 for which an MRI had been ordered for her right ankle due to the chronicity of ankle pain.  Patient did bring in a CD of the MRI of her ankle but there is no report available.  I have my office staff seek out this official report during her visit today.  She notes that she continues to have medial sided ankle pain and sensitivity.  She states use of a compression ankle sleeve has helped mitigate some of her pain but is still very sensitive and gives an example of her dog bumping her medial ankle causing a severe sharp sensation.  She states there can be swelling of her ankle towards the end of the day as well as bruising.  She denies any N/T of her right lower extremity.  She reports intact range of motion of her ankle.     Of note, patient states she previously was in a high tide Cam boot and felt that the pain was much more tolerable to weightbear.         Medical, Surgical, Family, and Social History    Past Medical History:   Diagnosis Date    ADHD     Allergic     Asthma     Depression     Diverticulitis of colon     Obesity      Past Surgical History:   Procedure Laterality Date    BREAST SURGERY  2012     Social History   Social History     Substance and Sexual Activity   Alcohol Use Not Currently    Comment: social     Social History     Substance and Sexual Activity   Drug Use Never     Social History     Tobacco Use   Smoking Status Never   Smokeless Tobacco Never     Family History   Problem Relation Age of Onset    Depression Mother     Diabetes Mother     ADD / ADHD  "Mother     Anxiety disorder Mother     No Known Problems Father     Breast cancer Maternal Aunt      Allergies   Allergen Reactions    Codeine Edema     Swelling    Dog Epithelium Sneezing     sneezing    Lactose Intolerance (Gi) - Food Allergy Diarrhea    Other Facial Swelling     Tide sport    Peanut Oil - Food Allergy Diarrhea          Physical Exam  /82 (BP Location: Left arm, Patient Position: Sitting, Cuff Size: Standard)   Pulse 72   Temp 98.5 °F (36.9 °C)   Ht 5' 5\" (1.651 m)   Wt 71.6 kg (157 lb 12.8 oz)   LMP 08/01/2024   SpO2 100%   BMI 26.26 kg/m²     Constitutional:  see vital signs  Gen: well-developed, normocephalic/atraumatic, well-groomed  Eyes: No inflammation or discharge of conjunctiva or lids; sclera clear   Pulmonary/Chest: Effort normal. No respiratory distress.       Ortho Exam   Ankle Examination (focused):     Gait: Patient is seen wearing an ankle brace on the right foot with slight antalgia      RIGHT   Inspection Erythema none    Edema none    Ecchymosis none        ROM:  Plantarflexion 50    Dorsiflexion 20        Strength Pronation 5/5    Supination 5-/5 aggravates medial ankle pain    Foot plantarflexion 5/5    Foot dorsflexion 5/5        TTP AiTFL no    ATFL no    CFL no    PTFL no    Achilles no    Deltoid no    Peroneal no    Tib Ant no    Tib Post no        TTP (Bony) Prox Fibula no    Lat Malleolus no    Base of 5th MT no    Med Malleolus +    Navicular no    Talar Dome no        Tinel's   negative   MT Compression  negative         No calf tenderness to palpation bilaterally    LE NV Exam: +2 DP/PT pulses bilaterally  Sensation intact to light touch L2-S1 bilaterally          Procedures        "

## 2024-08-28 NOTE — LETTER
August 28, 2024     Patient: Lima Anglin  YOB: 1975  Date of Visit: 8/28/2024      To Whom it May Concern:    Lima Anglin is under my professional care. January was seen in my office on 8/28/2024. Patient restricted to weightbearing in a CAM boot of her right foot. Restricted to sedentary duties at work. Referred to orthopedic surgery to discuss potential surgical intervention going forward.    If you have any questions or concerns, please don't hesitate to call.         Sincerely,          Nathaniel Wells MD        CC: No Recipients

## 2024-08-31 DIAGNOSIS — S99.911D RIGHT ANKLE INJURY, SUBSEQUENT ENCOUNTER: ICD-10-CM

## 2024-08-31 DIAGNOSIS — F90.0 ATTENTION DEFICIT HYPERACTIVITY DISORDER (ADHD), PREDOMINANTLY INATTENTIVE TYPE: ICD-10-CM

## 2024-09-01 RX ORDER — IBUPROFEN 800 MG/1
800 TABLET, FILM COATED ORAL 2 TIMES DAILY
Qty: 40 TABLET | Refills: 0 | OUTPATIENT
Start: 2024-09-01

## 2024-09-03 ENCOUNTER — OFFICE VISIT (OUTPATIENT)
Dept: OBGYN CLINIC | Facility: CLINIC | Age: 49
End: 2024-09-03
Payer: OTHER MISCELLANEOUS

## 2024-09-03 ENCOUNTER — HOSPITAL ENCOUNTER (OUTPATIENT)
Dept: RADIOLOGY | Facility: CLINIC | Age: 49
Discharge: HOME | End: 2024-09-03

## 2024-09-03 VITALS
WEIGHT: 158 LBS | BODY MASS INDEX: 26.33 KG/M2 | DIASTOLIC BLOOD PRESSURE: 80 MMHG | SYSTOLIC BLOOD PRESSURE: 110 MMHG | TEMPERATURE: 98.2 F | OXYGEN SATURATION: 100 % | HEIGHT: 65 IN | HEART RATE: 92 BPM

## 2024-09-03 DIAGNOSIS — S99.911D RIGHT ANKLE INJURY, SUBSEQUENT ENCOUNTER: ICD-10-CM

## 2024-09-03 DIAGNOSIS — S82.54XK: ICD-10-CM

## 2024-09-03 DIAGNOSIS — S99.911D RIGHT ANKLE INJURY, SUBSEQUENT ENCOUNTER: Primary | ICD-10-CM

## 2024-09-03 PROCEDURE — 73610 X-RAY EXAM OF ANKLE: CPT

## 2024-09-03 PROCEDURE — 99214 OFFICE O/P EST MOD 30 MIN: CPT | Performed by: ORTHOPAEDIC SURGERY

## 2024-09-03 RX ORDER — DEXTROAMPHETAMINE SACCHARATE, AMPHETAMINE ASPARTATE MONOHYDRATE, DEXTROAMPHETAMINE SULFATE AND AMPHETAMINE SULFATE 2.5; 2.5; 2.5; 2.5 MG/1; MG/1; MG/1; MG/1
20 CAPSULE, EXTENDED RELEASE ORAL EVERY MORNING
Qty: 60 CAPSULE | Refills: 0 | Status: SHIPPED | OUTPATIENT
Start: 2024-09-03

## 2024-09-03 RX ORDER — ACETAMINOPHEN 325 MG/1
975 TABLET ORAL ONCE
Status: CANCELLED | OUTPATIENT
Start: 2024-09-03 | End: 2024-09-03

## 2024-09-03 RX ORDER — CEFAZOLIN SODIUM 2 G/50ML
2000 SOLUTION INTRAVENOUS ONCE
Status: CANCELLED | OUTPATIENT
Start: 2024-09-10 | End: 2024-09-03

## 2024-09-03 RX ORDER — CHLORHEXIDINE GLUCONATE ORAL RINSE 1.2 MG/ML
15 SOLUTION DENTAL ONCE
Status: CANCELLED | OUTPATIENT
Start: 2024-09-03 | End: 2024-09-03

## 2024-09-03 RX ORDER — SODIUM CHLORIDE, SODIUM LACTATE, POTASSIUM CHLORIDE, CALCIUM CHLORIDE 600; 310; 30; 20 MG/100ML; MG/100ML; MG/100ML; MG/100ML
125 INJECTION, SOLUTION INTRAVENOUS CONTINUOUS
Status: CANCELLED | OUTPATIENT
Start: 2024-09-10

## 2024-09-03 NOTE — H&P (VIEW-ONLY)
ASSESSMENT/PLAN:    Diagnoses and all orders for this visit:    Right ankle injury, subsequent encounter  -     Ambulatory Referral to Orthopedic Surgery  -     XR ankle 3+ vw right; Future    Nondisplaced fracture of medial malleolus of right tibia, subsequent encounter for closed fracture with nonunion  -     Ambulatory Referral to Orthopedic Surgery  -     Case request operating room: OPEN REDUCTION W/ INTERNAL FIXATION (ORIF) ANKLE; Standing  -     Case request operating room: OPEN REDUCTION W/ INTERNAL FIXATION (ORIF) ANKLE    Other orders  -     Nursing Communication Warmimg Interventions Implemented; Standing  -     Nursing Communication CHG bath, have staff wash entire body (neck down) per pre-op bathing protocol. Routine, evening prior to, and day of surgery.; Standing  -     Nursing Communication Swab both nares with Povidone-Iodine solution, EXCLUDE if patient has shellfish/Iodine allergy, and replace with nasal alcohol swabstick. Routine, day of surgery, on call to OR; Standing  -     chlorhexidine (PERIDEX) 0.12 % oral rinse 15 mL  -     Void on call to OR; Standing  -     Insert peripheral IV; Standing  -     Diet NPO; Sips with meds; Standing  -     lactated ringers infusion  -     ceFAZolin (ANCEF) 2,000 mg in sodium chloride 0.9 % 50 mL IVPB  -     acetaminophen (TYLENOL) tablet 975 mg      Reviewed with patient at time of visit that physical exam and imaging demonstrate fracture of the medial malleolus of Right ankle. Discussed various option of conservative treatment vs surgical intervention. Also discussed an option of a bone stimulator. Patient has elected to have surgical repair.  The risk, benefits, options and alternatives of treatment were thoroughly discussed and she elected to proceed with surgical fixation of the medial malleolus.  This will be scheduled for 9/10/2024.  She will be nonweightbearing postoperatively.  After soft tissue healing has occurred, she will be provided with a bone  Nannette Sood is a 88 year old female presenting with fever and increased sensation to urinate. Pt states that she had a fever as high as 102.2 F in the night. Pt has been has been taking Tylenol and last took a dose at 7am. Pt states that she is prone to bladder infections and straight caths herself.  Was informed by her PCP to be seen if she spikes a fever.  Denies known Latex allergy or symptoms of Latex sensitivity. Medications verified, no changes.     stimulator device.  She is to contact me if questions or concerns arise prior to surgery.  I will see her 1 week postoperatively for her initial postop visit.      Return for 1 week postop.      _____________________________________________________  CHIEF COMPLAINT:  Chief Complaint   Patient presents with    Right Ankle - Fracture     Right ankle fracture         SUBJECTIVE:  January MAICO Anglin is a 49 y.o. year old female who presents with a right ankle injury. She was previously treated by Dr. Wells for a medial malleolus fracture. She has received X-rays and MRIs. She has been having pain since March after rolling her ankle in a pothole in her employer's parking lot. At that time she was seen by her PCP. She was then informed that her employer required her to see [facility] for the Worker's Comp claim. She went to [facility] and was referred to Orthopedics and Dr. Leonard. She was placed in an ankle brace initially. The pressure caused discomfort so she started using a CopperFit sleeve. She transitioned her care to Dr. Wells when she moved closer to the area. Today, she states that she continues to have worsening pain. She was continuing to wear a CopperFit sleeve until she received the MRI results. She has been wearing the CAM boot for the past few weeks. She notes pain on the medial side of her ankle radiating to the front.  She believes that she wore a cam boot for approximately 4 to 6 weeks after the initial injury prior to being seen by Dr. Leonard when she was transition to an ankle brace.  She denies any paresthesias.      PAST MEDICAL HISTORY:  Past Medical History:   Diagnosis Date    ADHD     Allergic     Asthma     Depression     Diverticulitis of colon     Obesity        PAST SURGICAL HISTORY:  Past Surgical History:   Procedure Laterality Date    BREAST SURGERY  2012       FAMILY HISTORY:  Family History   Problem Relation Age of Onset    Depression Mother     Diabetes Mother     ADD / ADHD  Mother     Anxiety disorder Mother     No Known Problems Father     Breast cancer Maternal Aunt        SOCIAL HISTORY:  Social History     Tobacco Use    Smoking status: Never    Smokeless tobacco: Never   Vaping Use    Vaping status: Never Used   Substance Use Topics    Alcohol use: Not Currently     Comment: social    Drug use: Never       MEDICATIONS:    Current Outpatient Medications:     albuterol (PROVENTIL HFA,VENTOLIN HFA) 90 mcg/act inhaler, INHALE 2 PUFFS EVERY FOUR HOURS AS NEEDED FOR WHEEZING, Disp: , Rfl:     amphetamine-dextroamphetamine (ADDERALL XR) 10 MG 24 hr capsule, take 2 capsules by mouth every morning, Disp: 60 capsule, Rfl: 0    azelastine (ASTELIN) 0.1 % nasal spray, 1 spray into each nostril 2 (two) times a day Use in each nostril as directed, Disp: , Rfl:     calcium carbonate-vitamin D 250 mg-3.125 mcg per tablet, Take 1 tablet by mouth daily, Disp: 90 tablet, Rfl: 0    cetirizine (ZyrTEC) 10 mg tablet, Take 1 tablet (10 mg total) by mouth daily, Disp: 30 tablet, Rfl: 5    EPINEPHrine (EPIPEN) 0.3 mg/0.3 mL SOAJ, inject 0.3 milliliters intramuscularly ONE TIME for 1 dose, Disp: , Rfl:     FLUoxetine (PROzac) 20 mg capsule, Take 1 capsule (20 mg total) by mouth daily, Disp: 30 capsule, Rfl: 5    fluticasone (FLONASE) 50 mcg/act nasal spray, 2 sprays into each nostril daily, Disp: , Rfl:     ibuprofen (MOTRIN) 800 mg tablet, Take 1 tablet (800 mg total) by mouth 2 (two) times a day, Disp: 40 tablet, Rfl: 2    methocarbamol (ROBAXIN) 500 mg tablet, Take 1 tablet (500 mg total) by mouth once as needed for muscle spasms, Disp: 15 tablet, Rfl: 0    montelukast (SINGULAIR) 10 mg tablet, Take 1 tablet (10 mg total) by mouth daily at bedtime, Disp: 30 tablet, Rfl: 5    neomycin-polymyxin-hydrocortisone (CORTISPORIN) otic solution, Administer 4 drops into the left ear every 6 (six) hours for 7 days, Disp: 10 mL, Rfl: 0    predniSONE 10 mg tablet, Take once daily all days pills on this schedule 6-  "6- 5- 4- 3- 2- 1, Disp: 27 tablet, Rfl: 0    SUMAtriptan (IMITREX) 25 mg tablet, Take 1 tablet (25 mg total) by mouth once as needed for migraine for up to 1 dose, Disp: 30 tablet, Rfl: 0    EPINEPHrine (EPIPEN) 0.3 mg/0.3 mL SOAJ, Inject 0.3 mL (0.3 mg total) into a muscle once for 1 dose, Disp: 0.6 mL, Rfl: 0    ibuprofen (MOTRIN) 600 mg tablet, Take 1 tablet (600 mg total) by mouth every 6 (six) hours as needed for mild pain or moderate pain for up to 7 days (Patient not taking: Reported on 8/28/2024), Disp: 28 tablet, Rfl: 0    ALLERGIES:  Allergies   Allergen Reactions    Codeine Edema     Swelling    Dog Epithelium Sneezing     sneezing    Lactose Intolerance (Gi) - Food Allergy Diarrhea    Other Facial Swelling     Tide sport    Peanut Oil - Food Allergy Diarrhea       Review of systems:   Constitutional: Negative for fatigue, fever or loss of apetite.   HENT: Negative.    Respiratory: Negative for shortness of breath, dyspnea.    Cardiovascular: Negative for chest pain/tightness.   Gastrointestinal: Negative for abdominal pain, N/V.   Endocrine: Negative for cold/heat intolerance, unexplained weight loss/gain.   Genitourinary: Negative for flank pain, dysuria, hematuria.   Musculoskeletal: As noted in HPI   Skin: Negative for rash.    Neurological: Negative  Psychiatric/Behavioral: Negative for agitation.  _____________________________________________________  PHYSICAL EXAMINATION:    Blood pressure 110/80, pulse 92, temperature 98.2 °F (36.8 °C), height 5' 5\" (1.651 m), weight 71.7 kg (158 lb), last menstrual period 08/01/2024, SpO2 100%.    General: well developed and well nourished, alert, oriented times 3, and appears comfortable  Psychiatric: Normal  HEENT: Benign  Cardiovascular: Regular    Pulmonary: No wheezing or stridor  Abdomen: Soft, Nontender  Skin: No masses, erythema, lacerations, fluctation, ulcerations  Neurovascular: Sensory and motor grossly intact    MUSCULOSKELETAL EXAMINATION:    right " ankle -   Patient ambulates with steady gait pattern  Uses No assistive device  No anatomical deformity  Skin is warm and dry to touch with no signs of erythema, ecchymosis, infection  No soft tissue swelling or effusion noted  TTP over medial malleolus  + medial talar tilt, - lateral talar tilt  - syndesmotic squeeze  Calf compartments are soft and supple  2+ TP and DP pulses with brisk capillary refill to the toes  Sural, saphenous, tibial, superficial, and deep peroneal motor and sensory distributions intact  Sensation to light touch intact distally        _____________________________________________________  STUDIES REVIEWED:  X-Ray of right ankle obtained on 9/3/24 were reviewed and demonstrate the fracture to be visible on these x-rays.  The original x-rays do not show any definitive abnormality.  The MRI shows evidence of a medial malleolus fracture nonunion.            Scribe Attestation      I,:  April Welch am acting as a scribe while in the presence of the attending physician.:       I,:  Jurgen Hussein, DO personally performed the services described in this documentation    as scribed in my presence.:

## 2024-09-03 NOTE — PROGRESS NOTES
ASSESSMENT/PLAN:    Diagnoses and all orders for this visit:    Right ankle injury, subsequent encounter  -     Ambulatory Referral to Orthopedic Surgery  -     XR ankle 3+ vw right; Future    Nondisplaced fracture of medial malleolus of right tibia, subsequent encounter for closed fracture with nonunion  -     Ambulatory Referral to Orthopedic Surgery  -     Case request operating room: OPEN REDUCTION W/ INTERNAL FIXATION (ORIF) ANKLE; Standing  -     Case request operating room: OPEN REDUCTION W/ INTERNAL FIXATION (ORIF) ANKLE    Other orders  -     Nursing Communication Warmimg Interventions Implemented; Standing  -     Nursing Communication CHG bath, have staff wash entire body (neck down) per pre-op bathing protocol. Routine, evening prior to, and day of surgery.; Standing  -     Nursing Communication Swab both nares with Povidone-Iodine solution, EXCLUDE if patient has shellfish/Iodine allergy, and replace with nasal alcohol swabstick. Routine, day of surgery, on call to OR; Standing  -     chlorhexidine (PERIDEX) 0.12 % oral rinse 15 mL  -     Void on call to OR; Standing  -     Insert peripheral IV; Standing  -     Diet NPO; Sips with meds; Standing  -     lactated ringers infusion  -     ceFAZolin (ANCEF) 2,000 mg in sodium chloride 0.9 % 50 mL IVPB  -     acetaminophen (TYLENOL) tablet 975 mg      Reviewed with patient at time of visit that physical exam and imaging demonstrate fracture of the medial malleolus of Right ankle. Discussed various option of conservative treatment vs surgical intervention. Also discussed an option of a bone stimulator. Patient has elected to have surgical repair.  The risk, benefits, options and alternatives of treatment were thoroughly discussed and she elected to proceed with surgical fixation of the medial malleolus.  This will be scheduled for 9/10/2024.  She will be nonweightbearing postoperatively.  After soft tissue healing has occurred, she will be provided with a bone  stimulator device.  She is to contact me if questions or concerns arise prior to surgery.  I will see her 1 week postoperatively for her initial postop visit.      Return for 1 week postop.      _____________________________________________________  CHIEF COMPLAINT:  Chief Complaint   Patient presents with    Right Ankle - Fracture     Right ankle fracture         SUBJECTIVE:  January MAICO Anglin is a 49 y.o. year old female who presents with a right ankle injury. She was previously treated by Dr. Wells for a medial malleolus fracture. She has received X-rays and MRIs. She has been having pain since March after rolling her ankle in a pothole in her employer's parking lot. At that time she was seen by her PCP. She was then informed that her employer required her to see [facility] for the Worker's Comp claim. She went to [facility] and was referred to Orthopedics and Dr. Leonard. She was placed in an ankle brace initially. The pressure caused discomfort so she started using a CopperFit sleeve. She transitioned her care to Dr. Wells when she moved closer to the area. Today, she states that she continues to have worsening pain. She was continuing to wear a CopperFit sleeve until she received the MRI results. She has been wearing the CAM boot for the past few weeks. She notes pain on the medial side of her ankle radiating to the front.  She believes that she wore a cam boot for approximately 4 to 6 weeks after the initial injury prior to being seen by Dr. Leonard when she was transition to an ankle brace.  She denies any paresthesias.      PAST MEDICAL HISTORY:  Past Medical History:   Diagnosis Date    ADHD     Allergic     Asthma     Depression     Diverticulitis of colon     Obesity        PAST SURGICAL HISTORY:  Past Surgical History:   Procedure Laterality Date    BREAST SURGERY  2012       FAMILY HISTORY:  Family History   Problem Relation Age of Onset    Depression Mother     Diabetes Mother     ADD / ADHD  Mother     Anxiety disorder Mother     No Known Problems Father     Breast cancer Maternal Aunt        SOCIAL HISTORY:  Social History     Tobacco Use    Smoking status: Never    Smokeless tobacco: Never   Vaping Use    Vaping status: Never Used   Substance Use Topics    Alcohol use: Not Currently     Comment: social    Drug use: Never       MEDICATIONS:    Current Outpatient Medications:     albuterol (PROVENTIL HFA,VENTOLIN HFA) 90 mcg/act inhaler, INHALE 2 PUFFS EVERY FOUR HOURS AS NEEDED FOR WHEEZING, Disp: , Rfl:     amphetamine-dextroamphetamine (ADDERALL XR) 10 MG 24 hr capsule, take 2 capsules by mouth every morning, Disp: 60 capsule, Rfl: 0    azelastine (ASTELIN) 0.1 % nasal spray, 1 spray into each nostril 2 (two) times a day Use in each nostril as directed, Disp: , Rfl:     calcium carbonate-vitamin D 250 mg-3.125 mcg per tablet, Take 1 tablet by mouth daily, Disp: 90 tablet, Rfl: 0    cetirizine (ZyrTEC) 10 mg tablet, Take 1 tablet (10 mg total) by mouth daily, Disp: 30 tablet, Rfl: 5    EPINEPHrine (EPIPEN) 0.3 mg/0.3 mL SOAJ, inject 0.3 milliliters intramuscularly ONE TIME for 1 dose, Disp: , Rfl:     FLUoxetine (PROzac) 20 mg capsule, Take 1 capsule (20 mg total) by mouth daily, Disp: 30 capsule, Rfl: 5    fluticasone (FLONASE) 50 mcg/act nasal spray, 2 sprays into each nostril daily, Disp: , Rfl:     ibuprofen (MOTRIN) 800 mg tablet, Take 1 tablet (800 mg total) by mouth 2 (two) times a day, Disp: 40 tablet, Rfl: 2    methocarbamol (ROBAXIN) 500 mg tablet, Take 1 tablet (500 mg total) by mouth once as needed for muscle spasms, Disp: 15 tablet, Rfl: 0    montelukast (SINGULAIR) 10 mg tablet, Take 1 tablet (10 mg total) by mouth daily at bedtime, Disp: 30 tablet, Rfl: 5    neomycin-polymyxin-hydrocortisone (CORTISPORIN) otic solution, Administer 4 drops into the left ear every 6 (six) hours for 7 days, Disp: 10 mL, Rfl: 0    predniSONE 10 mg tablet, Take once daily all days pills on this schedule 6-  "6- 5- 4- 3- 2- 1, Disp: 27 tablet, Rfl: 0    SUMAtriptan (IMITREX) 25 mg tablet, Take 1 tablet (25 mg total) by mouth once as needed for migraine for up to 1 dose, Disp: 30 tablet, Rfl: 0    EPINEPHrine (EPIPEN) 0.3 mg/0.3 mL SOAJ, Inject 0.3 mL (0.3 mg total) into a muscle once for 1 dose, Disp: 0.6 mL, Rfl: 0    ibuprofen (MOTRIN) 600 mg tablet, Take 1 tablet (600 mg total) by mouth every 6 (six) hours as needed for mild pain or moderate pain for up to 7 days (Patient not taking: Reported on 8/28/2024), Disp: 28 tablet, Rfl: 0    ALLERGIES:  Allergies   Allergen Reactions    Codeine Edema     Swelling    Dog Epithelium Sneezing     sneezing    Lactose Intolerance (Gi) - Food Allergy Diarrhea    Other Facial Swelling     Tide sport    Peanut Oil - Food Allergy Diarrhea       Review of systems:   Constitutional: Negative for fatigue, fever or loss of apetite.   HENT: Negative.    Respiratory: Negative for shortness of breath, dyspnea.    Cardiovascular: Negative for chest pain/tightness.   Gastrointestinal: Negative for abdominal pain, N/V.   Endocrine: Negative for cold/heat intolerance, unexplained weight loss/gain.   Genitourinary: Negative for flank pain, dysuria, hematuria.   Musculoskeletal: As noted in HPI   Skin: Negative for rash.    Neurological: Negative  Psychiatric/Behavioral: Negative for agitation.  _____________________________________________________  PHYSICAL EXAMINATION:    Blood pressure 110/80, pulse 92, temperature 98.2 °F (36.8 °C), height 5' 5\" (1.651 m), weight 71.7 kg (158 lb), last menstrual period 08/01/2024, SpO2 100%.    General: well developed and well nourished, alert, oriented times 3, and appears comfortable  Psychiatric: Normal  HEENT: Benign  Cardiovascular: Regular    Pulmonary: No wheezing or stridor  Abdomen: Soft, Nontender  Skin: No masses, erythema, lacerations, fluctation, ulcerations  Neurovascular: Sensory and motor grossly intact    MUSCULOSKELETAL EXAMINATION:    right " ankle -   Patient ambulates with steady gait pattern  Uses No assistive device  No anatomical deformity  Skin is warm and dry to touch with no signs of erythema, ecchymosis, infection  No soft tissue swelling or effusion noted  TTP over medial malleolus  + medial talar tilt, - lateral talar tilt  - syndesmotic squeeze  Calf compartments are soft and supple  2+ TP and DP pulses with brisk capillary refill to the toes  Sural, saphenous, tibial, superficial, and deep peroneal motor and sensory distributions intact  Sensation to light touch intact distally        _____________________________________________________  STUDIES REVIEWED:  X-Ray of right ankle obtained on 9/3/24 were reviewed and demonstrate the fracture to be visible on these x-rays.  The original x-rays do not show any definitive abnormality.  The MRI shows evidence of a medial malleolus fracture nonunion.            Scribe Attestation      I,:  April Welch am acting as a scribe while in the presence of the attending physician.:       I,:  Jurgen Hussein, DO personally performed the services described in this documentation    as scribed in my presence.:

## 2024-09-05 ENCOUNTER — TELEPHONE (OUTPATIENT)
Dept: OBGYN CLINIC | Facility: HOSPITAL | Age: 49
End: 2024-09-05

## 2024-09-05 NOTE — TELEPHONE ENCOUNTER
Caller: Dejon Marks    Doctor/Office: Jovita/ERNST    CB#: [phone]      What needs to be faxed: UMESHN 8/28/24 and 9/3/24    ATTN to: Dejon    Fax#: [phone]      Documents were successfully e-faxed

## 2024-09-06 NOTE — PRE-PROCEDURE INSTRUCTIONS
Pre-Surgery Instructions:   Medication Instructions    albuterol (PROVENTIL HFA,VENTOLIN HFA) 90 mcg/act inhaler Uses PRN- OK to take day of surgery- if needed may take DOS     amphetamine-dextroamphetamine (ADDERALL XR) 10 MG 24 hr capsule Hold day of surgery.    azelastine (ASTELIN) 0.1 % nasal spray Take day of surgery.    cetirizine (ZyrTEC) 10 mg tablet Take night before surgery    Cholecalciferol (VITAMIN D-3 PO) Stop taking 7 days prior to surgery.    EPINEPHrine (EPIPEN) 0.3 mg/0.3 mL SOAJ Uses PRN- OK to take day of surgery    FLUoxetine (PROzac) 20 mg capsule Take night before surgery    fluticasone (FLONASE) 50 mcg/act nasal spray Take day of surgery.    ibuprofen (MOTRIN) 800 mg tablet Stop taking 3 days prior to surgery.    methocarbamol (ROBAXIN) 500 mg tablet Take night before surgery    montelukast (SINGULAIR) 10 mg tablet Take night before surgery    neomycin-polymyxin-hydrocortisone (CORTISPORIN) otic solution Uses PRN- DO NOT take day of surgery    SUMAtriptan (IMITREX) 25 mg tablet Uses PRN- OK to take day of surgery- if needed may take with sip of water     Pt instructed on use of cleanser for DOS and instructions for showering both night before and DOS reviewed with pt - pt verbalized understanding of instructions given  Pt also instructed on no hair or body products on skin for DOS.  No shaving with a razor blade for 7 days prior to surgery to decrease any incident of infection - electric razor is ok to use up till 24 hrs prior to DOS.  Pt instructed that if with any changes in health status between now and DOS - notify surgeon office.  Tylenol is ok to use as needed up to and including DOS with sips of water - if needed - did instruct NO NSAIDS to be used at least 3 days prior to surgery - or if given a longer hold time of NSAIDS from MD please follow MD hold instructions.  Instructed to bring photo ID and medical insurance card for DOS as forms of identification for DOS.  Also instructed pt  on no jewelry or body piercings, no valuables on body for DOS. Contact lenses, if worn - need to be removed for DOS.  Pt instructed does need transport home after surgery- pt is not allowed to drive.    Medication instructions for day surgery reviewed. Please use only a sip of water to take your instructed medications. Avoid all over the counter vitamins, supplements and NSAIDS for one week prior to surgery per anesthesia guidelines. Tylenol is ok to take as needed.     You will receive a call one business day prior to surgery with an arrival time and hospital directions. If your surgery is scheduled on a Monday, the hospital will be calling you on the Friday prior to your surgery. If you have not heard from anyone by 8pm, please call the hospital supervisor through the hospital  at [phone]. ([place] [phone] or [place] [phone]).    Do not eat or drink anything after midnight the night before your surgery, including candy, mints, lifesavers, or chewing gum. Do not drink alcohol 24hrs before your surgery. Try not to smoke at least 24hrs before your surgery.       Follow the pre surgery showering instructions as listed in the “My Surgical Experience Booklet” or otherwise provided by your surgeon's office. Do not use a blade to shave the surgical area 1 week before surgery. It is okay to use a clean electric clippers up to 24 hours before surgery. Do not apply any lotions, creams, including makeup, cologne, deodorant, or perfumes after showering on the day of your surgery. Do not use dry shampoo, hair spray, hair gel, or any type of hair products.     No contact lenses, eye make-up, or artificial eyelashes. Remove nail polish, including gel polish, and any artificial, gel, or acrylic nails if possible. Remove all jewelry including rings and body piercing jewelry.     Wear causal clothing that is easy to take on and off. Consider your type of surgery.    Keep any valuables, jewelry, piercings at  home. Please bring any specially ordered equipment (sling, braces) if indicated.    Arrange for a responsible person to drive you to and from the hospital on the day of your surgery. Please confirm the visitor policy for the day of your procedure when you receive your phone call with an arrival time.     Call the surgeon's office with any new illnesses, exposures, or additional questions prior to surgery.    Please reference your “My Surgical Experience Booklet” for additional information to prepare for your upcoming surgery.

## 2024-09-08 ENCOUNTER — ANESTHESIA EVENT (OUTPATIENT)
Dept: PERIOP | Facility: HOSPITAL | Age: 49
End: 2024-09-08
Payer: OTHER MISCELLANEOUS

## 2024-09-08 NOTE — ANESTHESIA PREPROCEDURE EVALUATION
Procedure:  OPEN REDUCTION W/ INTERNAL FIXATION (ORIF) ANKLE (Right: Ankle)    Relevant Problems   CARDIO   (+) Migraine with aura      NEURO/PSYCH   (+) Depression with anxiety   (+) Migraine with aura   (+) Neck pain, chronic      PULMONARY   (+) Asthma        Physical Exam    Airway    Mallampati score: II  TM Distance: >3 FB  Neck ROM: full     Dental   No notable dental hx     Cardiovascular  Cardiovascular exam normal    Pulmonary  Pulmonary exam normal     Other Findings  post-pubertal.      Anesthesia Plan  ASA Score- 2     Anesthesia Type- general with ASA Monitors.         Additional Monitors:     Airway Plan: LMA.    Comment: Pop and adductor canal.       Plan Factors-Exercise tolerance (METS): >4 METS.    Chart reviewed. EKG reviewed. Imaging results reviewed. Existing labs reviewed. Patient summary reviewed.    Patient is not a current smoker.      There is medical exclusion for perioperative obstructive sleep apnea risk education.        Induction- intravenous.    Postoperative Plan-     Perioperative Resuscitation Plan - Level 1 - Full Code.       Informed Consent- Anesthetic plan and risks discussed with patient.  I personally reviewed this patient with the CRNA. Discussed and agreed on the Anesthesia Plan with the CRNA..

## 2024-09-10 ENCOUNTER — ANESTHESIA (OUTPATIENT)
Dept: PERIOP | Facility: HOSPITAL | Age: 49
End: 2024-09-10
Payer: OTHER MISCELLANEOUS

## 2024-09-10 ENCOUNTER — TELEPHONE (OUTPATIENT)
Age: 49
End: 2024-09-10

## 2024-09-10 ENCOUNTER — HOSPITAL ENCOUNTER (OUTPATIENT)
Facility: HOSPITAL | Age: 49
Setting detail: OUTPATIENT SURGERY
Discharge: HOME | End: 2024-09-10
Attending: ORTHOPAEDIC SURGERY | Admitting: ORTHOPAEDIC SURGERY
Payer: OTHER MISCELLANEOUS

## 2024-09-10 ENCOUNTER — APPOINTMENT (OUTPATIENT)
Dept: RADIOLOGY | Facility: HOSPITAL | Age: 49
End: 2024-09-10
Payer: OTHER MISCELLANEOUS

## 2024-09-10 VITALS
TEMPERATURE: 98.2 F | SYSTOLIC BLOOD PRESSURE: 131 MMHG | HEART RATE: 101 BPM | RESPIRATION RATE: 23 BRPM | OXYGEN SATURATION: 98 % | DIASTOLIC BLOOD PRESSURE: 83 MMHG

## 2024-09-10 DIAGNOSIS — S99.911D RIGHT ANKLE INJURY, SUBSEQUENT ENCOUNTER: Primary | ICD-10-CM

## 2024-09-10 DIAGNOSIS — S82.54XK: Primary | ICD-10-CM

## 2024-09-10 DIAGNOSIS — S99.911D RIGHT ANKLE INJURY, SUBSEQUENT ENCOUNTER: ICD-10-CM

## 2024-09-10 DIAGNOSIS — S82.54XK: ICD-10-CM

## 2024-09-10 LAB
EXT PREGNANCY TEST URINE: NEGATIVE
EXT. CONTROL: NORMAL

## 2024-09-10 PROCEDURE — C1713 ANCHOR/SCREW BN/BN,TIS/BN: HCPCS | Performed by: ORTHOPAEDIC SURGERY

## 2024-09-10 PROCEDURE — 81025 URINE PREGNANCY TEST: CPT | Performed by: ORTHOPAEDIC SURGERY

## 2024-09-10 PROCEDURE — 27720 REPAIR OF TIBIA: CPT

## 2024-09-10 PROCEDURE — C9290 INJ, BUPIVACAINE LIPOSOME: HCPCS | Performed by: ANESTHESIOLOGY

## 2024-09-10 PROCEDURE — 73610 X-RAY EXAM OF ANKLE: CPT

## 2024-09-10 PROCEDURE — C1769 GUIDE WIRE: HCPCS | Performed by: ORTHOPAEDIC SURGERY

## 2024-09-10 PROCEDURE — 27720 REPAIR OF TIBIA: CPT | Performed by: ORTHOPAEDIC SURGERY

## 2024-09-10 DEVICE — 4.0MM CANNULATED SCREW SHORT THREAD/44MM: Type: IMPLANTABLE DEVICE | Site: ANKLE | Status: FUNCTIONAL

## 2024-09-10 DEVICE — 3.5MM CORTEX SCREW SELF-TAPPING 34MM: Type: IMPLANTABLE DEVICE | Site: ANKLE | Status: FUNCTIONAL

## 2024-09-10 DEVICE — 3.5MM CORTEX SCREW SELF-TAPPING 38MM: Type: IMPLANTABLE DEVICE | Site: ANKLE | Status: FUNCTIONAL

## 2024-09-10 DEVICE — 3.5MM LCP HOOK PLATE 3 HOLE STERILE
Type: IMPLANTABLE DEVICE | Site: ANKLE | Status: FUNCTIONAL
Brand: LCP

## 2024-09-10 RX ORDER — BUPIVACAINE HYDROCHLORIDE 5 MG/ML
INJECTION, SOLUTION EPIDURAL; INTRACAUDAL
Status: DISCONTINUED | OUTPATIENT
Start: 2024-09-10 | End: 2024-09-10

## 2024-09-10 RX ORDER — PROPOFOL 10 MG/ML
INJECTION, EMULSION INTRAVENOUS AS NEEDED
Status: DISCONTINUED | OUTPATIENT
Start: 2024-09-10 | End: 2024-09-10

## 2024-09-10 RX ORDER — CEFAZOLIN SODIUM 2 G/50ML
2000 SOLUTION INTRAVENOUS ONCE
Status: COMPLETED | OUTPATIENT
Start: 2024-09-10 | End: 2024-09-10

## 2024-09-10 RX ORDER — OXYCODONE HYDROCHLORIDE 5 MG/1
TABLET ORAL
Qty: 30 TABLET | Refills: 0 | Status: SHIPPED | OUTPATIENT
Start: 2024-09-10

## 2024-09-10 RX ORDER — FENTANYL CITRATE/PF 50 MCG/ML
50 SYRINGE (ML) INJECTION
Status: DISCONTINUED | OUTPATIENT
Start: 2024-09-10 | End: 2024-09-10 | Stop reason: HOSPADM

## 2024-09-10 RX ORDER — DEXAMETHASONE SODIUM PHOSPHATE 10 MG/ML
INJECTION, SOLUTION INTRAMUSCULAR; INTRAVENOUS AS NEEDED
Status: DISCONTINUED | OUTPATIENT
Start: 2024-09-10 | End: 2024-09-10

## 2024-09-10 RX ORDER — SODIUM CHLORIDE, SODIUM LACTATE, POTASSIUM CHLORIDE, CALCIUM CHLORIDE 600; 310; 30; 20 MG/100ML; MG/100ML; MG/100ML; MG/100ML
INJECTION, SOLUTION INTRAVENOUS CONTINUOUS PRN
Status: DISCONTINUED | OUTPATIENT
Start: 2024-09-10 | End: 2024-09-10

## 2024-09-10 RX ORDER — CHLORHEXIDINE GLUCONATE ORAL RINSE 1.2 MG/ML
15 SOLUTION DENTAL ONCE
Status: COMPLETED | OUTPATIENT
Start: 2024-09-10 | End: 2024-09-10

## 2024-09-10 RX ORDER — ALBUTEROL SULFATE 0.83 MG/ML
2.5 SOLUTION RESPIRATORY (INHALATION) ONCE
Status: COMPLETED | OUTPATIENT
Start: 2024-09-10 | End: 2024-09-10

## 2024-09-10 RX ORDER — SODIUM CHLORIDE, SODIUM LACTATE, POTASSIUM CHLORIDE, CALCIUM CHLORIDE 600; 310; 30; 20 MG/100ML; MG/100ML; MG/100ML; MG/100ML
125 INJECTION, SOLUTION INTRAVENOUS CONTINUOUS
Status: DISCONTINUED | OUTPATIENT
Start: 2024-09-10 | End: 2024-09-10 | Stop reason: HOSPADM

## 2024-09-10 RX ORDER — ACETAMINOPHEN 500 MG
1000 TABLET ORAL EVERY 8 HOURS SCHEDULED
Qty: 100 TABLET | Refills: 0 | Status: SHIPPED | OUTPATIENT
Start: 2024-09-10

## 2024-09-10 RX ORDER — MIDAZOLAM HYDROCHLORIDE 2 MG/2ML
INJECTION, SOLUTION INTRAMUSCULAR; INTRAVENOUS AS NEEDED
Status: DISCONTINUED | OUTPATIENT
Start: 2024-09-10 | End: 2024-09-10

## 2024-09-10 RX ORDER — ONDANSETRON 2 MG/ML
4 INJECTION INTRAMUSCULAR; INTRAVENOUS EVERY 6 HOURS PRN
Status: DISCONTINUED | OUTPATIENT
Start: 2024-09-10 | End: 2024-09-10 | Stop reason: HOSPADM

## 2024-09-10 RX ORDER — ACETAMINOPHEN 325 MG/1
975 TABLET ORAL EVERY 6 HOURS PRN
Status: DISCONTINUED | OUTPATIENT
Start: 2024-09-10 | End: 2024-09-10 | Stop reason: HOSPADM

## 2024-09-10 RX ORDER — ACETAMINOPHEN 325 MG/1
975 TABLET ORAL ONCE
Status: COMPLETED | OUTPATIENT
Start: 2024-09-10 | End: 2024-09-10

## 2024-09-10 RX ORDER — OXYCODONE HYDROCHLORIDE 5 MG/1
5 TABLET ORAL EVERY 4 HOURS PRN
Qty: 30 TABLET | Refills: 0 | Status: SHIPPED | OUTPATIENT
Start: 2024-09-10 | End: 2024-09-10

## 2024-09-10 RX ORDER — LIDOCAINE HYDROCHLORIDE 10 MG/ML
INJECTION, SOLUTION EPIDURAL; INFILTRATION; INTRACAUDAL; PERINEURAL AS NEEDED
Status: DISCONTINUED | OUTPATIENT
Start: 2024-09-10 | End: 2024-09-10

## 2024-09-10 RX ORDER — FENTANYL CITRATE 50 UG/ML
INJECTION, SOLUTION INTRAMUSCULAR; INTRAVENOUS AS NEEDED
Status: DISCONTINUED | OUTPATIENT
Start: 2024-09-10 | End: 2024-09-10

## 2024-09-10 RX ORDER — MAGNESIUM HYDROXIDE 1200 MG/15ML
LIQUID ORAL AS NEEDED
Status: DISCONTINUED | OUTPATIENT
Start: 2024-09-10 | End: 2024-09-10 | Stop reason: HOSPADM

## 2024-09-10 RX ORDER — HYDROMORPHONE HCL/PF 1 MG/ML
0.5 SYRINGE (ML) INJECTION
Status: COMPLETED | OUTPATIENT
Start: 2024-09-10 | End: 2024-09-10

## 2024-09-10 RX ORDER — ONDANSETRON 2 MG/ML
4 INJECTION INTRAMUSCULAR; INTRAVENOUS ONCE AS NEEDED
Status: DISCONTINUED | OUTPATIENT
Start: 2024-09-10 | End: 2024-09-10 | Stop reason: SDUPTHER

## 2024-09-10 RX ORDER — ONDANSETRON 2 MG/ML
INJECTION INTRAMUSCULAR; INTRAVENOUS AS NEEDED
Status: DISCONTINUED | OUTPATIENT
Start: 2024-09-10 | End: 2024-09-10

## 2024-09-10 RX ADMIN — SODIUM CHLORIDE, SODIUM LACTATE, POTASSIUM CHLORIDE, AND CALCIUM CHLORIDE 125 ML/HR: .6; .31; .03; .02 INJECTION, SOLUTION INTRAVENOUS at 07:00

## 2024-09-10 RX ADMIN — ONDANSETRON 4 MG: 2 INJECTION INTRAMUSCULAR; INTRAVENOUS at 08:30

## 2024-09-10 RX ADMIN — LIDOCAINE HYDROCHLORIDE 50 MG: 10 INJECTION, SOLUTION EPIDURAL; INFILTRATION; INTRACAUDAL; PERINEURAL at 07:44

## 2024-09-10 RX ADMIN — FENTANYL CITRATE 50 MCG: 50 INJECTION INTRAMUSCULAR; INTRAVENOUS at 08:01

## 2024-09-10 RX ADMIN — CHLORHEXIDINE GLUCONATE 15 ML: 1.2 RINSE ORAL at 06:49

## 2024-09-10 RX ADMIN — BUPIVACAINE HYDROCHLORIDE 20 ML: 5 INJECTION, SOLUTION EPIDURAL; INTRACAUDAL at 07:15

## 2024-09-10 RX ADMIN — HYDROMORPHONE HYDROCHLORIDE 0.5 MG: 1 INJECTION, SOLUTION INTRAMUSCULAR; INTRAVENOUS; SUBCUTANEOUS at 09:17

## 2024-09-10 RX ADMIN — ACETAMINOPHEN 975 MG: 325 TABLET ORAL at 06:48

## 2024-09-10 RX ADMIN — SODIUM CHLORIDE, SODIUM LACTATE, POTASSIUM CHLORIDE, AND CALCIUM CHLORIDE: .6; .31; .03; .02 INJECTION, SOLUTION INTRAVENOUS at 07:14

## 2024-09-10 RX ADMIN — MIDAZOLAM 2 MG: 1 INJECTION INTRAMUSCULAR; INTRAVENOUS at 07:14

## 2024-09-10 RX ADMIN — ALBUTEROL SULFATE 2.5 MG: 2.5 SOLUTION RESPIRATORY (INHALATION) at 06:49

## 2024-09-10 RX ADMIN — BUPIVACAINE HYDROCHLORIDE 20 ML: 5 INJECTION, SOLUTION EPIDURAL; INTRACAUDAL; PERINEURAL at 07:00

## 2024-09-10 RX ADMIN — BUPIVACAINE 10 ML: 13.3 INJECTION, SUSPENSION, LIPOSOMAL INFILTRATION at 07:05

## 2024-09-10 RX ADMIN — FENTANYL CITRATE 50 MCG: 50 INJECTION INTRAMUSCULAR; INTRAVENOUS at 07:44

## 2024-09-10 RX ADMIN — DEXAMETHASONE SODIUM PHOSPHATE 10 MG: 10 INJECTION, SOLUTION INTRAMUSCULAR; INTRAVENOUS at 07:44

## 2024-09-10 RX ADMIN — CEFAZOLIN SODIUM 2000 MG: 2 SOLUTION INTRAVENOUS at 07:47

## 2024-09-10 RX ADMIN — HYDROMORPHONE HYDROCHLORIDE 0.5 MG: 1 INJECTION, SOLUTION INTRAMUSCULAR; INTRAVENOUS; SUBCUTANEOUS at 09:01

## 2024-09-10 RX ADMIN — PROPOFOL 200 MG: 10 INJECTION, EMULSION INTRAVENOUS at 07:44

## 2024-09-10 NOTE — ANESTHESIA PROCEDURE NOTES
Peripheral Block    Patient location during procedure: OR  Start time: 9/10/2024 7:15 AM  Reason for block: at surgeon's request and post-op pain management  Staffing  Performed by: Victor Hugo Gramajo MD  Authorized by: Victor Hugo Gramajo MD    Preanesthetic Checklist  Completed: patient identified, IV checked, site marked, risks and benefits discussed, surgical consent, monitors and equipment checked, pre-op evaluation and timeout performed  Peripheral Block  Patient position: supine  Block type: Adductor Canal  Laterality: right  Injection technique: single-shot  Procedures: ultrasound guided, Ultrasound guidance required for the procedure to increase accuracy and safety of medication placement and decrease risk of complications.  Ultrasound permanent image saved  bupivacaine (PF) (MARCAINE) 0.5 % injection 20 mL - Perineural   20 mL - 9/10/2024 7:15:00 AM  bupivacaine liposomal (EXPAREL) 1.3 % injection 20 mL - Perineural   10 mL - 9/10/2024 7:15:00 AM  Needle  Needle type: Stimuplex   Needle gauge: 20 G  Needle length: 4 in  Needle localization: ultrasound guidance  Assessment  Injection assessment: frequent aspiration, injected with ease, negative aspiration, no paresthesia on injection, incremental injection, needle tip visualized at all times, negative for heart rate change and no symptoms of intraneural/intravenous injection  Paresthesia pain: none  Post-procedure:  site cleaned  patient tolerated the procedure well with no immediate complications

## 2024-09-10 NOTE — INTERVAL H&P NOTE
H&P reviewed. After examining the patient I find no changes in the patients condition since the H&P had been written.    Vitals:    09/10/24 0643   BP: 119/77   Pulse: 80   Resp: 18   Temp: (!) 97.2 °F (36.2 °C)   SpO2: 100%

## 2024-09-10 NOTE — TELEPHONE ENCOUNTER
Caller: Patient    Doctor: Dr. Hussein    Reason for call: Patient calling stating she had surgery today for right ankle ORIF and she is currently experiencing numbness, tingling, and pain in her toes and she is wondering if this is normal?  She is asking to speak to clinical team.    Call back#: [phone]

## 2024-09-10 NOTE — OP NOTE
OPERATIVE REPORT  PATIENT NAME: Lima Anglin    :  1975  MRN: [id number]  Pt Location: OW OR ROOM 02    SURGERY DATE: 9/10/2024    Surgeons and Role:     * Jurgen Hussein DO - Primary  Assist:        MINE Cavazos PA-C    Preop Diagnosis:  Nondisplaced fracture of medial malleolus of right tibia, subsequent encounter for closed fracture with nonunion [S82.54XK]    Post-Op Diagnosis Codes:     * Nondisplaced fracture of medial malleolus of right tibia, subsequent encounter for closed fracture with nonunion [S82.54XK]    Procedure(s):  Right - OPEN REDUCTION W/ INTERNAL FIXATION (ORIF) ANKLE    Tourniquet time: 44 minutes at 300 mmHg    Estimated Blood Loss:   Minimal    Fluids: 800 cc    Anesthesia Type:   General With peripheral nerve block    Operative Indications:  Nondisplaced fracture of medial malleolus of right tibia, 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  She was initially treated with immobilization for a sprain as x-rays demonstrated no evidence of injury.  She had persistent symptoms and, due to these persistent symptoms, an MRI was recently obtained demonstrating evidence of a fracture of the medial malleolus with nonunion.  X-rays confirmed a fracture of the medial malleolus.  The risk, benefits, options and alternatives of treatment were discussed and it was elected to proceed with surgical fixation.    Operative Findings:  At the time of the procedure, the fracture was not mobile and it was not felt that takedown of this fracture site would be of significant benefit.  However, the fracture was fixated utilizing a 3-hole hook plate with compression at the fracture site noted on the post fixation x-ray images.      Complications:   None    Procedure and Technique:  January was taken to the operating room where she was administered a general anesthetic after she had been  administered a peripheral nerve block in the holding area.  She was provided with preoperative antibiotics.  A well-padded tourniquet was applied to the proximal right lower extremity and then the right lower extremity prepped and draped in standard fashion.  A timeout was performed.  The limb was exsanguinated with an Esmarch bandage and the tourniquet then inflated to 300 mmHg.    An incision was made over the medial aspect of the distal tibia with sharp dissection carried down through the skin and subcutaneous tissues.  Bleeding was controlled with cautery.  The saphenous vein was retracted anteriorly and the distal tibia exposed subperiosteally.  The fracture site could not be directly palpated nor could an elevator demonstrate a softening of the bone.  Fluoroscopic images were obtained confirming the elevator at the fracture site exit along the medial aspect of the tibial metaphyseal region.  Further exploration of this area demonstrated no significant softening of any tissue and it was not felt that takedown of the fracture site would be of great benefit.  A 3-hole hook plate was placed over the distal tibia, contoured slightly and then impacted into position with fluoroscopic images demonstrating good position.  This was then fixated distally with a cannulated screw directed anterolaterally.  2 cortical screws were then placed through the plate proximal to the fracture site with demonstration of fracture compression with tightening of the distal screw.  Fluoroscopic images in the AP, lateral and mortise planes were obtained demonstrating the fracture to be compressed and improved contact versus preoperative imaging.    The wound was irrigated with saline solution.  The periosteum was approximated with 0 Vicryl.  The subcutaneous tissues were approximated with 2-0 Vicryl.  Skin was secured with skin glue and dressings and applied consisting of dry Telfa, gauze and Webril.  The tourniquet was deflated after  total tourniquet time of 44 minutes and then the limb immobilized with a U-splint.  The patient was awakened from the general anesthetic and transferred to the recovery room in stable and satisfactory postoperative condition.   I was present for the entire procedure., A qualified resident physician was not available., and A physician assistant was required during the procedure for retraction, tissue handling, dissection and suturing.    Patient Disposition:  PACU              SIGNATURE: Jurgen Hussein DO  DATE: September 10, 2024  TIME: 9:05 AM

## 2024-09-10 NOTE — TELEPHONE ENCOUNTER
Attempted to call pt but no answer and mailbox not set up so unable to leave a voice mail. Will call again later

## 2024-09-10 NOTE — TELEPHONE ENCOUNTER
Caller: Patient    Doctor: Jovita    Reason for call: Patient just had surgery today and couldn't  her Oxycodone because the pharmacy states there are 2 different directions on the script and they would need a whole new script. Patient would like it put in ASAP as she is starting to experience some pain. Thank you.    Call back#: [phone]

## 2024-09-10 NOTE — TELEPHONE ENCOUNTER
Caller: Maninder    Doctor: Jovita    Reason for call: Just missed your call- Please call back when free    Call back#: [phone]

## 2024-09-10 NOTE — ANESTHESIA POSTPROCEDURE EVALUATION
Post-Op Assessment Note    CV Status:  Stable  Pain Score: 0    Pain management: adequate       Mental Status:  Alert and awake   Hydration Status:  Stable   PONV Controlled:  Controlled   Airway Patency:  Patent     Post Op Vitals Reviewed: Yes    No anethesia notable event occurred.    Staff: CRNA               /69 (09/10/24 0856)    Temp 97.5 °F (36.4 °C) (09/10/24 0856)    Pulse 96 (09/10/24 0856)   Resp 18 (09/10/24 0856)    SpO2 98 % (09/10/24 0856)

## 2024-09-10 NOTE — DISCHARGE INSTR - AVS FIRST PAGE
Nonweightbearing right lower extremity with crutch assistance or using a kneeling walker.  Keep dressings clean, dry and intact.    Follow-up with office in 1 week as scheduled    Take Tylenol 1000 mg every 8 hours around-the-clock and use the oxycodone as needed for pain.    Call the office if any questions or concerns arise.

## 2024-09-10 NOTE — TELEPHONE ENCOUNTER
Pharmacy called the RX Refill Line. Message is being forwarded to the office.     Pharmacy is requesting a new prescription for Oxycodone 5 mg. Pharmacy states there are 2 different directions, and since it is a narcotic is needs a whole new script.

## 2024-09-10 NOTE — ANESTHESIA PROCEDURE NOTES
Peripheral Block    Patient location during procedure: holding area  Start time: 9/10/2024 7:00 AM  Reason for block: at surgeon's request and post-op pain management  Staffing  Performed by: Victor Hugo Gramajo MD  Authorized by: Victor Hugo Gramajo MD    Preanesthetic Checklist  Completed: patient identified, IV checked, site marked, risks and benefits discussed, surgical consent, monitors and equipment checked, pre-op evaluation and timeout performed  Peripheral Block  Patient position: left lateral  Block type: Popliteal  Laterality: right  Injection technique: single-shot  Procedures: ultrasound guided, Ultrasound guidance required for the procedure to increase accuracy and safety of medication placement and decrease risk of complications.  Ultrasound permanent image saved  bupivacaine (PF) (MARCAINE) 0.5 % injection 20 mL - Perineural   20 mL - 9/10/2024 7:00:00 AM  bupivacaine liposomal (EXPAREL) 1.3 % injection 20 mL - Perineural   10 mL - 9/10/2024 7:05:00 AM  Needle  Needle type: Stimuplex   Needle gauge: 20 G  Needle length: 4 in  Needle localization: ultrasound guidance  Catheter size: 20 G  Assessment  Injection assessment: frequent aspiration, needle tip visualized at all times, injected with ease, negative aspiration, no paresthesia on injection, incremental injection, negative for heart rate change and no symptoms of intraneural/intravenous injection  Paresthesia pain: none  Post-procedure:  site cleaned  patient tolerated the procedure well with no immediate complications

## 2024-09-12 DIAGNOSIS — J30.2 SEASONAL ALLERGIC RHINITIS, UNSPECIFIED TRIGGER: Chronic | ICD-10-CM

## 2024-09-12 DIAGNOSIS — F41.8 DEPRESSION WITH ANXIETY: Chronic | ICD-10-CM

## 2024-09-12 RX ORDER — MONTELUKAST SODIUM 10 MG/1
10 TABLET ORAL
Qty: 30 TABLET | Refills: 5 | Status: SHIPPED | OUTPATIENT
Start: 2024-09-12

## 2024-09-13 ENCOUNTER — TELEPHONE (OUTPATIENT)
Dept: OBGYN CLINIC | Facility: HOSPITAL | Age: 49
End: 2024-09-13

## 2024-09-13 NOTE — TELEPHONE ENCOUNTER
Caller: Dejon-Maureen Munoz -Selina    Doctor/Office: Jovita    CB#: [phone]      What needs to be faxed: Op report 09/10/24    ATTN to: Dejon From [facility]    Fax#: [phone]      Documents were successfully e-faxed

## 2024-09-16 ENCOUNTER — OFFICE VISIT (OUTPATIENT)
Dept: OBGYN CLINIC | Facility: CLINIC | Age: 49
End: 2024-09-16

## 2024-09-16 VITALS
BODY MASS INDEX: 26.33 KG/M2 | HEIGHT: 65 IN | OXYGEN SATURATION: 98 % | WEIGHT: 158 LBS | DIASTOLIC BLOOD PRESSURE: 78 MMHG | SYSTOLIC BLOOD PRESSURE: 124 MMHG | HEART RATE: 84 BPM | TEMPERATURE: 97.8 F

## 2024-09-16 DIAGNOSIS — S82.54XK: Primary | ICD-10-CM

## 2024-09-16 DIAGNOSIS — Z09 POSTOP CHECK: ICD-10-CM

## 2024-09-16 PROCEDURE — 99024 POSTOP FOLLOW-UP VISIT: CPT | Performed by: PHYSICIAN ASSISTANT

## 2024-09-16 NOTE — LETTER
September 16, 2024     Patient: Lima Anglin   YOB: 1975   Date of Visit: 9/16/2024       To Whom It May Concern:    It is my medical opinion that Lima Anglin may return to light duty immediately with the following restrictions: Nonweightbearing on the right ankle, no driving, and sedentary duty with ability to elevate ankle as much as possible.  Will remain in place until her follow-up appointment in 2 weeks but will likely continue for a total of approximately 6 weeks postsurgery.    If you have any questions or concerns, please don't hesitate to call.         Sincerely,        Ruben Michael PA-C    CC: No Recipients

## 2024-09-16 NOTE — PROGRESS NOTES
"Patient Name:  Lima Anglin  MRN:  [id number]    Assessment     1. 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 Postop check          Plan     Patient is to continue nonweightbearing on the right lower extremity.  She may continue to use the I- walk.  She may wash the area but is not to soak or submerge the incision in any water.  May ice and elevate the ankle pain control.  The dressing can be used Ace wrap to limit swelling however a dressing is not needed if there is no discharge.  Work note was provided.  Patient will be seen back in orthopedic office in 2 weeks for x-ray and follow-up.  Tylenol around-the-clock and supplement with narcotic only if needed.  She is not to drive.  Discussed use of prophylactic 81 mg aspirin twice daily for DVT prevention. She will continue in the cam boot and report any issues with the boot and I-walk.      Return in about 2 weeks (around 9/30/2024) for Recheck x-ray right ankle.    Subjective   Lima Anglin returns for follow-up of ORIF right medial malleolus fracture, DOS 9/10/2024.  The patient is 6 day(s) post op and returns for routine follow-up. Patient complains of affected pain over the medial ankle.  She denies any calf pain chest pain or shortness of breath.  She is using an iON successfully.  She is due to start a new job tomorrow if accommodations are able to be made.  She notes some decreased sensation that continues about the toe area.  She did have Exparel block done by anesthesia.    Objective     /78   Pulse 84   Temp 97.8 °F (36.6 °C) (Temporal)   Ht 5' 5\" (1.651 m)   Wt 71.7 kg (158 lb)   LMP 08/19/2024 (Exact Date)   SpO2 98%   BMI 26.29 kg/m²     Right ankle vision is healing well with skin glue in place.  There is no erythema or significant swelling.  No signs of infection.  She has a negative Homans' sign no calf pain.  Decreased sensation to toes but gross " range of motion of the toes is intact.  Remainder of the lower extremity maintains her normal sensation.    Data Review     Intraoperative films from 6 days ago reviewed with the patient.    Ruben Michael PA-C

## 2024-09-16 NOTE — PATIENT INSTRUCTIONS
Patient is to continue nonweightbearing on the right lower extremity.  She may continue to use the I- walk.  She may wash the area but is not to soak or submerge the incision in any water.  May ice and elevate the ankle pain control.  The dressing can be used Ace wrap to limit swelling however a dressing is not needed if there is no discharge.  Work note was provided.  Patient will be seen back in orthopedic office in 2 weeks for x-ray and follow-up.  Tylenol around-the-clock and supplement with narcotic only if needed.  She is not to drive.  Discussed use of prophylactic 81 mg aspirin twice daily for DVT prevention.

## 2024-09-18 DIAGNOSIS — F41.8 DEPRESSION WITH ANXIETY: Chronic | ICD-10-CM

## 2024-09-18 DIAGNOSIS — G89.29 NECK PAIN, CHRONIC: ICD-10-CM

## 2024-09-18 DIAGNOSIS — J30.9 ALLERGIC RHINITIS, UNSPECIFIED SEASONALITY, UNSPECIFIED TRIGGER: Chronic | ICD-10-CM

## 2024-09-18 DIAGNOSIS — M54.2 NECK PAIN, CHRONIC: ICD-10-CM

## 2024-09-18 DIAGNOSIS — J30.2 SEASONAL ALLERGIC RHINITIS, UNSPECIFIED TRIGGER: Chronic | ICD-10-CM

## 2024-09-18 RX ORDER — MONTELUKAST SODIUM 10 MG/1
10 TABLET ORAL
Qty: 30 TABLET | Refills: 0 | OUTPATIENT
Start: 2024-09-18

## 2024-09-18 RX ORDER — CETIRIZINE HYDROCHLORIDE 10 MG/1
10 TABLET ORAL DAILY
Qty: 30 TABLET | Refills: 5 | Status: SHIPPED | OUTPATIENT
Start: 2024-09-18

## 2024-09-19 ENCOUNTER — TELEPHONE (OUTPATIENT)
Dept: OBGYN CLINIC | Facility: CLINIC | Age: 49
End: 2024-09-19

## 2024-09-19 ENCOUNTER — NURSE TRIAGE (OUTPATIENT)
Dept: OTHER | Facility: OTHER | Age: 49
End: 2024-09-19

## 2024-09-19 ENCOUNTER — DOCUMENTATION (OUTPATIENT)
Dept: OBGYN CLINIC | Facility: CLINIC | Age: 49
End: 2024-09-19

## 2024-09-19 ENCOUNTER — PATIENT MESSAGE (OUTPATIENT)
Dept: OBGYN CLINIC | Facility: CLINIC | Age: 49
End: 2024-09-19

## 2024-09-19 DIAGNOSIS — S99.911D RIGHT ANKLE INJURY, SUBSEQUENT ENCOUNTER: Primary | ICD-10-CM

## 2024-09-19 DIAGNOSIS — S82.54XK: ICD-10-CM

## 2024-09-19 DIAGNOSIS — T30.0 BURN: Primary | ICD-10-CM

## 2024-09-19 RX ORDER — OXYCODONE HYDROCHLORIDE 5 MG/1
5 TABLET ORAL EVERY 6 HOURS PRN
Qty: 20 TABLET | Refills: 0 | Status: SHIPPED | OUTPATIENT
Start: 2024-09-19 | End: 2024-09-20

## 2024-09-19 RX ORDER — SILVER SULFADIAZINE 10 MG/G
CREAM TOPICAL 2 TIMES DAILY
Qty: 20 G | Refills: 0 | Status: SHIPPED | OUTPATIENT
Start: 2024-09-19 | End: 2024-09-20

## 2024-09-19 RX ORDER — METHOCARBAMOL 500 MG/1
500 TABLET, FILM COATED ORAL ONCE AS NEEDED
Qty: 15 TABLET | Refills: 0 | Status: SHIPPED | OUTPATIENT
Start: 2024-09-19

## 2024-09-19 NOTE — TELEPHONE ENCOUNTER
"Reason for Disposition  • [1] Prescription not at pharmacy AND [2] was prescribed by PCP recently (Exception: [facility] has access to EMR and prescription is recorded there. Go to Home Care and confirm for pharmacy.)    Answer Assessment - Initial Assessment Questions  1. NAME of MEDICINE: \"What medicine(s) are you calling about?\"      Silvadene cream 1%  2. QUESTION: \"What is your question?\" (e.g., double dose of medicine, side effect)      They do not have the size ordered and only have 85 gram tube available and would need to order the 20 gram tube.  3. PRESCRIBER: \"Who prescribed the medicine?\" Reason: if prescribed by specialist, call should be referred to that group.      Anthony Michael  4. SYMPTOMS: \"Do you have any symptoms?\" If Yes, ask: \"What symptoms are you having?\"  \"How bad are the symptoms (e.g., mild, moderate, severe)      Burn treatment    Protocols used: Medication Question Call-Adult-    "

## 2024-09-19 NOTE — PATIENT COMMUNICATION
Patient contacted the office multiple times today.  She had concern over the grease burn that she had sustained to the front of her foot a few days ago prior her to her last visit on 9/16/24.  A prescription for Silvadene cream was placed to treat that area twice daily.  A new work note was provided.  Then a prescription renewal request for narcotic which, after review of the PDMP, was renewed for 20 tablets 1 every 6 hours with a note telling the patient that she must limit her narcotic use as this may be her last narcotic prescription.  Earlier today she was contacted about icing and elevating and reconfirming the use of 1000 mg of Tylenol every 8 hours.

## 2024-09-19 NOTE — TELEPHONE ENCOUNTER
ESC to Dr Shea:Pt  needs her silvadene cream 1 % sent over to this [id number] - [place] - [address] [facility] Phone: [phone] .[facility]. The pharmacy will need a new order because they only have 85 grams in stock currently and would need to order the 20 gram. Pt is painful and would like to pick this up tonight if possible.

## 2024-09-19 NOTE — PROGRESS NOTES
Patient called again with concerns to the color of the burn on the front of her foot from cooking oil.  There is no erythema.  A prescription for Silvadene ointment was entered in epic to applied twice daily to the burn area only.  This is not to be applied to her surgical incision.

## 2024-09-20 ENCOUNTER — TELEPHONE (OUTPATIENT)
Dept: OBGYN CLINIC | Facility: HOSPITAL | Age: 49
End: 2024-09-20

## 2024-09-20 DIAGNOSIS — S82.54XK: Primary | ICD-10-CM

## 2024-09-20 DIAGNOSIS — T30.0 BURN: ICD-10-CM

## 2024-09-20 RX ORDER — OXYCODONE HYDROCHLORIDE 5 MG/1
5 TABLET ORAL EVERY 6 HOURS PRN
Qty: 20 TABLET | Refills: 0 | Status: SHIPPED | OUTPATIENT
Start: 2024-09-20 | End: 2024-09-25

## 2024-09-20 RX ORDER — SILVER SULFADIAZINE 10 MG/G
CREAM TOPICAL 2 TIMES DAILY
Qty: 85 G | Refills: 0 | Status: SHIPPED | OUTPATIENT
Start: 2024-09-20

## 2024-09-20 NOTE — TELEPHONE ENCOUNTER
ESC response from Dr Chan: Due to pharmacy has closed she will need to contact the office  tomorrow.

## 2024-09-20 NOTE — TELEPHONE ENCOUNTER
Caller: Mariah Hagan    Doctor/Office: Jovita//[facility]    CB#: [phone]      What needs to be faxed: OVN and work status letter 9/16/24    ATTN to: Mariah    Fax#: [phone]      Documents were successfully e-faxed

## 2024-09-20 NOTE — PROGRESS NOTES
"Patient inadvertently contacted me through my personal phone letting me know the prescriptions that were placed yesterday went to the wrong pharmacy as she had multiple listed.  Attempted to call patient after changing the pharmacy locations but she did not answer.  A message was left on the answering machine letting her know the prescriptions were sent to her preferred pharmacy.  She was also notified that an order will be placed for evaluation by wound care for this \"burn\" area.  She was also politely asked to not use the personal private number for any further communication, and that should go through the regular orthopedic office directory.  "

## 2024-09-20 NOTE — TELEPHONE ENCOUNTER
Tried to reach pt twice to make aware that she will need to contact the office in the am but no answer. I left a message for pt to call back into the service.

## 2024-09-27 ENCOUNTER — TELEPHONE (OUTPATIENT)
Facility: CLINIC | Age: 49
End: 2024-09-27

## 2024-09-27 ENCOUNTER — TELEPHONE (OUTPATIENT)
Dept: OBGYN CLINIC | Facility: CLINIC | Age: 49
End: 2024-09-27

## 2024-09-27 DIAGNOSIS — G43.109 MIGRAINE WITH AURA AND WITHOUT STATUS MIGRAINOSUS, NOT INTRACTABLE: Chronic | ICD-10-CM

## 2024-09-27 RX ORDER — SUMATRIPTAN 25 MG/1
25 TABLET, FILM COATED ORAL ONCE AS NEEDED
Qty: 30 TABLET | Refills: 0 | Status: SHIPPED | OUTPATIENT
Start: 2024-09-27

## 2024-09-27 NOTE — TELEPHONE ENCOUNTER
Called and left patient voice message that appointment on 9/30 needs to be rescheduled due to provider not being in office. Appointment can be rescheduled to 10/1 for POST OP follow up in office with Ruben.

## 2024-09-30 ENCOUNTER — TELEPHONE (OUTPATIENT)
Dept: OBGYN CLINIC | Facility: CLINIC | Age: 49
End: 2024-09-30

## 2024-09-30 ENCOUNTER — TELEPHONE (OUTPATIENT)
Age: 49
End: 2024-09-30

## 2024-09-30 NOTE — TELEPHONE ENCOUNTER
Called and left message for patient. Patient needs to be seen in office for POST OP appointment this week by Ruben. Scheduled patient for 10/2 at 3:30pm in office. Left office number for patient to call back to confirm that they received message and to confirm the appointment.     [phone]

## 2024-09-30 NOTE — TELEPHONE ENCOUNTER
Hello,    Please advise if a forced appointment can be accommodated for the patient:    Call back #: [phone]    Insurance    Reason for appointment: post op    Requested doctor and/or location: [facility]/Ruben Michael PA-C       Thank you.

## 2024-10-01 NOTE — TELEPHONE ENCOUNTER
Recalled LVM appointment is scheduled with Ruben 10/2/24 and that message was also left stating that.

## 2024-10-02 ENCOUNTER — OFFICE VISIT (OUTPATIENT)
Dept: OBGYN CLINIC | Facility: CLINIC | Age: 49
End: 2024-10-02

## 2024-10-02 ENCOUNTER — HOSPITAL ENCOUNTER (OUTPATIENT)
Dept: RADIOLOGY | Facility: CLINIC | Age: 49
Discharge: HOME | End: 2024-10-02
Payer: OTHER MISCELLANEOUS

## 2024-10-02 VITALS
OXYGEN SATURATION: 100 % | HEIGHT: 65 IN | WEIGHT: 158 LBS | SYSTOLIC BLOOD PRESSURE: 130 MMHG | HEART RATE: 98 BPM | BODY MASS INDEX: 26.33 KG/M2 | TEMPERATURE: 97.6 F | DIASTOLIC BLOOD PRESSURE: 80 MMHG

## 2024-10-02 DIAGNOSIS — S82.54XK: ICD-10-CM

## 2024-10-02 DIAGNOSIS — S82.54XK: Primary | ICD-10-CM

## 2024-10-02 PROCEDURE — 73610 X-RAY EXAM OF ANKLE: CPT

## 2024-10-02 PROCEDURE — 99024 POSTOP FOLLOW-UP VISIT: CPT | Performed by: PHYSICIAN ASSISTANT

## 2024-10-02 NOTE — LETTER
October 2, 2024     Patient: Lima Anglin   YOB: 1975   Date of Visit: 10/2/2024       To Whom It May Concern:    It is my medical opinion that Lima Anglin should remain out of work until his appointment in 3 weeks.  At that time she will likely be able to return to work in some capacity.    If you have any questions or concerns, please don't hesitate to call.         Sincerely,        Ruben Michael PA-C    CC: No Recipients

## 2024-10-02 NOTE — PATIENT INSTRUCTIONS
Patient may use the Silvadene ointment only as needed now to the baking grease burn area as this looks very good.  She canceled her wound care appointment to the progress that she has made with that and I think that is reasonable.  She will continue in the cam boot can be open to evaluate the incision and the burn area.  Would keep patient out of work into her follow-up appointment in 3 weeks.  Will undergo repeat x-ray of the ankle in 3 weeks.  She is to continue nonweightbearing of the right foot.  She may stop the use of aspirin and use Tylenol as needed and supplement with ibuprofen only as needed to avoid complications of her IBS.  May wash around the incision and allow the glue to come off slowly.  She is not to peel the skin glue off.  She is not to soak or submerge the incision in water.  She is not to drive.  She may work on the ankle with bathing

## 2024-10-02 NOTE — PROGRESS NOTES
Patient Name:  Lima Anglin  MRN:  [id number]    Assessment     1. 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  She canceled her wound care appointment to the progress that she has made with that and I think that is reasonable.  She will continue in the cam boot can be open to evaluate the incision and the burn area.  Would keep patient out of work into her follow-up appointment in 3 weeks.  Will undergo repeat x-ray of the ankle in 3 weeks.  She is to continue nonweightbearing of the right foot.  She may stop the use of aspirin and use Tylenol as needed and supplement with ibuprofen only as needed to avoid complications of her IBS.  May wash around the incision and allow the glue to come off slowly.  She is not to peel the skin glue off.  She is not to soak or submerge the incision in water.  She is not to drive.  She work on motion of the ankle with bathing.    Return in about 3 weeks (around 10/23/2024) for X-ray right ankle.    Subjective     Lima Anglin returns for follow-up of  ORIF right medial malleolus fracture, DOS 9/10/2024.   The patient is 3 week(s) post op and returns for routine follow-up. Patient complains of fatigue.  He denies any significant ankle pain and is stopped using the Oxycodone due to the way it was making her feel.  She had been using some ibuprofen but is limiting the use of that due to some mild GI upset and history of IBS.  She stopped the use of the aspirin secondary to using the ibuprofen.  He reports she slipped getting out of the shower and put a little bit of weight on the tip of her toes but denied any increased ankle pain after that episode.  Denies gross numbness or tingling but has slightly decreased sensation distal to the incision.  She had 1  "episode of bloody drainage at the incision but no other incisional problems.    Objective     /80   Pulse 98   Temp 97.6 °F (36.4 °C) (Temporal)   Ht 5' 5\" (1.651 m)   Wt 71.7 kg (158 lb)   LMP 08/19/2024 (Exact Date)   SpO2 100%   BMI 26.29 kg/m²     Right ankle presents without gross ecchymosis.  There is only minimal swelling in the area there is no calf pain she has a negative Homans' sign. The burn area the anterior aspect her foot is completely healed and is without signs of infection, swelling, nor discharge.  The medial incision has skin glue partially in place.  The edges are approximated.  There is no erythema or gross swelling in the area.  There is very minimal tenderness.  There is no signs of infection.  She is able to flex and extend the toes without pain.  Full passive plantar/dorsi flexion elicits no discomfort.  Light touch sensation is grossly intact throughout the foot.  There is no lateral anterior or posterior ankle or foot pain.  The dorsum of the foot is not swollen.      Data Review     I have personally reviewed pertinent films in PACS documenting no change in the ankle mortise or hardware position.  There appears to be improved healing from the intraoperative x-ray.    Ruben Michael PA-C     "

## 2024-10-08 ENCOUNTER — TELEPHONE (OUTPATIENT)
Dept: OBGYN CLINIC | Facility: CLINIC | Age: 49
End: 2024-10-08

## 2024-10-08 NOTE — TELEPHONE ENCOUNTER
Called and spoke with patient. Patient is scheduled for follow up appointment per Dr. Hussein tomorrow in office, 10/9 at 12:45. Patient is aware of appointment date and time.

## 2024-10-09 ENCOUNTER — OFFICE VISIT (OUTPATIENT)
Dept: OBGYN CLINIC | Facility: CLINIC | Age: 49
End: 2024-10-09

## 2024-10-09 VITALS
DIASTOLIC BLOOD PRESSURE: 72 MMHG | TEMPERATURE: 98.2 F | HEIGHT: 65 IN | OXYGEN SATURATION: 99 % | WEIGHT: 158 LBS | BODY MASS INDEX: 26.33 KG/M2 | HEART RATE: 91 BPM | SYSTOLIC BLOOD PRESSURE: 118 MMHG

## 2024-10-09 DIAGNOSIS — S82.54XK: Primary | ICD-10-CM

## 2024-10-09 PROCEDURE — 99024 POSTOP FOLLOW-UP VISIT: CPT | Performed by: ORTHOPAEDIC SURGERY

## 2024-10-09 NOTE — PROGRESS NOTES
"Patient Name:  Lima Anglin  MRN:  [id number]    Assessment     1. 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  The boot may be removed for inactivity and cleansing but should remain in place otherwise.  I will see her back in 2 weeks as previously scheduled.  She will remain out of work at this time but I would anticipate return to work in at least a limited duty capacity at follow-up.  She is to contact the office if questions or concerns arise.  She was reassured that I did not see evidence to suggest DVT.    Return in about 2 weeks (around 10/23/2024).      Subjective   Lima Anglin returns for follow-up of her right ankle.  She is 5 weeks post fixation of a nonunion of her medial malleolus fracture.  She contacted the office noting some right calf spasms and cramping which had been persistent for 48 to 72 hours.  She denies any chest pain or shortness of breath.  She denies any other systemic symptoms.  She does admit to a slightly increased level of activity as she is trying to unpack having moved into a new home.  However, she states she has been compliant with limited weightbearing.  She did try bearing weight on the heel of her cam boot earlier today without pain.  Objective     /72   Pulse 91   Temp 98.2 °F (36.8 °C) (Temporal)   Ht 5' 5\" (1.651 m)   Wt 71.7 kg (158 lb)   LMP 08/19/2024 (Exact Date)   SpO2 99%   BMI 26.29 kg/m²     Exam today demonstrates the incision to be well-healed and benign.  Calf compartments are all soft, compressible and she has a negative Homans' sign.  She does complain of some mild tenderness to palpation of the calf musculature.  There is no swelling noted.      Jurgen Hussein, DO    "

## 2024-10-10 DIAGNOSIS — G89.29 NECK PAIN, CHRONIC: ICD-10-CM

## 2024-10-10 DIAGNOSIS — M54.2 NECK PAIN, CHRONIC: ICD-10-CM

## 2024-10-10 DIAGNOSIS — F90.0 ATTENTION DEFICIT HYPERACTIVITY DISORDER (ADHD), PREDOMINANTLY INATTENTIVE TYPE: ICD-10-CM

## 2024-10-11 ENCOUNTER — TELEPHONE (OUTPATIENT)
Age: 49
End: 2024-10-11

## 2024-10-11 RX ORDER — METHOCARBAMOL 500 MG/1
500 TABLET, FILM COATED ORAL ONCE AS NEEDED
Qty: 15 TABLET | Refills: 0 | Status: SHIPPED | OUTPATIENT
Start: 2024-10-11

## 2024-10-11 RX ORDER — DEXTROAMPHETAMINE SACCHARATE, AMPHETAMINE ASPARTATE MONOHYDRATE, DEXTROAMPHETAMINE SULFATE AND AMPHETAMINE SULFATE 2.5; 2.5; 2.5; 2.5 MG/1; MG/1; MG/1; MG/1
20 CAPSULE, EXTENDED RELEASE ORAL EVERY MORNING
Qty: 60 CAPSULE | Refills: 0 | Status: SHIPPED | OUTPATIENT
Start: 2024-10-11

## 2024-10-11 NOTE — TELEPHONE ENCOUNTER
Caller: Mariah from [facility]     Doctor: Ruben Hussein     Reason for call: electronically faxed 10/2 and 10/9 OVN and work status to fax # [phone]     Call back#: [phone]

## 2024-10-23 ENCOUNTER — OFFICE VISIT (OUTPATIENT)
Dept: OBGYN CLINIC | Facility: CLINIC | Age: 49
End: 2024-10-23

## 2024-10-23 VITALS
BODY MASS INDEX: 26.79 KG/M2 | HEIGHT: 65 IN | TEMPERATURE: 98.6 F | HEART RATE: 80 BPM | OXYGEN SATURATION: 98 % | SYSTOLIC BLOOD PRESSURE: 122 MMHG | DIASTOLIC BLOOD PRESSURE: 78 MMHG | WEIGHT: 160.8 LBS

## 2024-10-23 DIAGNOSIS — Z98.890 STATUS POST OPEN REDUCTION WITH INTERNAL FIXATION (ORIF) OF FRACTURE OF ANKLE: ICD-10-CM

## 2024-10-23 DIAGNOSIS — S82.891D CLOSED FRACTURE OF MALLEOLUS OF RIGHT ANKLE WITH ROUTINE HEALING, SUBSEQUENT ENCOUNTER: Primary | ICD-10-CM

## 2024-10-23 DIAGNOSIS — Z87.81 STATUS POST OPEN REDUCTION WITH INTERNAL FIXATION (ORIF) OF FRACTURE OF ANKLE: ICD-10-CM

## 2024-10-23 PROCEDURE — 99024 POSTOP FOLLOW-UP VISIT: CPT | Performed by: ORTHOPAEDIC SURGERY

## 2024-10-23 NOTE — PROGRESS NOTES
"Patient Name:  Lima Anglin  MRN:  [id number]    Assessment     1. Closed fracture of malleolus of right ankle with routine healing, subsequent encounter  Durable Medical Equipment      2. Status post open reduction with internal fixation (ORIF) of fracture of ankle            Plan     Discussed appropriate healing of the ORIF of the right medial malleolus  Discussed swelling is normal after this kind of surgery which can be present for up to a year  Ankle brace was provided to wear when active.  This may be removed for cleansing and sleeping and periods of inactivity  Recommends starting physical therapy  F/U in 3-4 weeks for repeat x-rays          Subjective   Lima Anglin returns for follow-up of ORIF of the right ankle. The patient is 6 week(s) post ORIF of right ankle and returns for routine follow-up. Patient complains of clicking in the ankle. She has slight pain at times when moving the ankle out of the boot. She is still taking her motrin as needed for pain.       Objective     /78 (BP Location: Left arm, Patient Position: Sitting, Cuff Size: Standard)   Pulse 80   Temp 98.6 °F (37 °C) (Temporal)   Ht 5' 5\" (1.651 m)   Wt 72.9 kg (160 lb 12.8 oz)   SpO2 98%   BMI 26.76 kg/m²     right ankle - ORIF of the medial malleolus  Patient ambulates with antalgic gait pattern  Uses  assistive device  No anatomical deformity  Skin is warm and dry to touch with no signs of erythema, ecchymosis, infection  Mild generalized soft tissue swelling or effusion noted  ROM Full  TTP over medial malleolus   MMT: PF 5/5, DF 5/5, Inv 5/5, Ev 5/5  - anterior drawer  - medial talar tilt, - lateral talar tilt  - syndesmotic squeeze  Calf compartments are soft and supple  2+ TP and DP pulses with brisk capillary refill to the toes  Sural, saphenous, tibial, superficial, and deep peroneal motor and sensory distributions intact  Sensation to light touch intact distally       Data Review     No new imaging.  Scribe " Attestation      I,:  Manuel Sanchez am acting as a scribe while in the presence of the attending physician.:       I,:  Jurgen Hussein, DO personally performed the services described in this documentation    as scribed in my presence.:

## 2024-10-23 NOTE — LETTER
October 23, 2024     Patient: Lima Anglin  YOB: 1975  Date of Visit: 10/23/2024      To Whom it May Concern:    Lima Anglin is under my professional care. January was seen in my office on 10/23/2024. January may return to work on 10/28/2024.  She must wear her ankle brace while working.  She should be provided primarily sedentary duty with limited walking/standing only as tolerated.  This restriction remains in effect until she is rechecked in approximately 4 weeks .    If you have any questions or concerns, please don't hesitate to call.         Sincerely,          Jurgen Hussein DO        CC: No Recipients

## 2024-10-28 ENCOUNTER — TELEPHONE (OUTPATIENT)
Dept: OBGYN CLINIC | Facility: HOSPITAL | Age: 49
End: 2024-10-28

## 2024-10-28 DIAGNOSIS — M54.2 NECK PAIN, CHRONIC: ICD-10-CM

## 2024-10-28 DIAGNOSIS — G89.29 NECK PAIN, CHRONIC: ICD-10-CM

## 2024-10-28 NOTE — TELEPHONE ENCOUNTER
Caller: Mariah-Work Comp    Doctor/Office: Dr. Hussein/ [facility]    #: [phone]      What needs to be faxed: OVN 10/23/24- Work Status 10/23/24    ATTN to: MARIAH [facility]    Fax#: [phone]      Documents were successfully e-faxed

## 2024-10-29 RX ORDER — METHOCARBAMOL 500 MG/1
TABLET, FILM COATED ORAL
Qty: 15 TABLET | Refills: 0 | Status: SHIPPED | OUTPATIENT
Start: 2024-10-29

## 2024-11-11 ENCOUNTER — OFFICE VISIT (OUTPATIENT)
Dept: OBGYN CLINIC | Facility: CLINIC | Age: 49
End: 2024-11-11

## 2024-11-11 VITALS
HEIGHT: 65 IN | OXYGEN SATURATION: 98 % | SYSTOLIC BLOOD PRESSURE: 128 MMHG | HEART RATE: 92 BPM | TEMPERATURE: 98.4 F | DIASTOLIC BLOOD PRESSURE: 74 MMHG | BODY MASS INDEX: 26.78 KG/M2 | WEIGHT: 160.72 LBS

## 2024-11-11 DIAGNOSIS — Z87.81 STATUS POST OPEN REDUCTION WITH INTERNAL FIXATION (ORIF) OF FRACTURE OF ANKLE: Primary | ICD-10-CM

## 2024-11-11 DIAGNOSIS — Z98.890 STATUS POST OPEN REDUCTION WITH INTERNAL FIXATION (ORIF) OF FRACTURE OF ANKLE: Primary | ICD-10-CM

## 2024-11-11 PROCEDURE — 99024 POSTOP FOLLOW-UP VISIT: CPT | Performed by: PHYSICIAN ASSISTANT

## 2024-11-11 NOTE — PATIENT INSTRUCTIONS
Patient was given a work note to be out of work today.  She may return to work tomorrow.  She will follow-up next week at her regular scheduled appointment and undergo repeat x-rays as previously planned.  Recommendation is to ice and elevate immediately on return home from work.  She may do some scar massage.  Continue previous current recommendations until next week's follow-up.

## 2024-11-11 NOTE — LETTER
November 11, 2024     Patient: Lima Anglin   YOB: 1975   Date of Visit: 11/11/2024       To Whom It May Concern:    It is my medical opinion that Lima Anglin to be excused from work today 11/11/2024 due to office appointment.  She may return to work 11/12/2024 and follow-up next week at her previously scheduled office appointment.  She is to continue in her ankle brace.    If you have any questions or concerns, please don't hesitate to call.         Sincerely,        Ruben Michael PA-C    CC: No Recipients

## 2024-11-11 NOTE — PROGRESS NOTES
"Patient Name:  Lima Anglin  MRN:  [id number]    Assessment     1. Status post open reduction with internal fixation (ORIF) of fracture of ankle          Plan     Patient was given a work note to be out of work today.  She may return to work tomorrow.  She will follow-up next week at her regular scheduled appointment and undergo repeat x-rays as previously planned.  Recommendation is to ice and elevate immediately on return home from work.  She may do some scar massage.  Continue previous current recommendations until next week's follow-up.    Return for Next week as previously scheduled.    Subjective   Lima Anglin returns for incision check of right ankle medial malleoli are ORIF, DOS 9/10/2024.  The patient is 9 week(s) post op and returns for incision evaluation routine follow-up. Patient complains of some warmth and burning about the ankle as well as a fever and some clicking in the ankle.  She feels the ankle is swollen.  Denies fevers or chills.  Patient is not in physical therapy.    Objective     /74 (BP Location: Left arm, Patient Position: Sitting, Cuff Size: Standard)   Pulse 92   Temp 98.4 °F (36.9 °C) (Temporal)   Ht 5' 5\" (1.651 m)   Wt 72.9 kg (160 lb 11.5 oz)   SpO2 98%   BMI 26.74 kg/m²     Right ankle incision shows no signs of infection.  There is very faint discoloration over the medial malleolus without a swelling just some soft tissue thickening.  The lateral ankle has minimal swelling near the ATFL region.  There is no tenderness at the ATFL region.  There is no warmth with palpation over the medial ankle.  He notes some hypersensitivity of the proximal scar which has thickened subcutaneous scar in the area but distally there is no thickened scar.  There is very faint clicking of the tendons with active plantar and dorsiflexion of the ankle.  There is negative anterior drawer.  There is no discrete bony tenderness.  There is no bony deformity.  There is no rash.  Light " touch sensation is intact distally.  DP and PT pulses are intact.  No calf pain or swelling.      Data Review     Pertinent data for review.    Ruben Michael PA-C

## 2024-11-18 DIAGNOSIS — F90.0 ATTENTION DEFICIT HYPERACTIVITY DISORDER (ADHD), PREDOMINANTLY INATTENTIVE TYPE: ICD-10-CM

## 2024-11-18 DIAGNOSIS — J30.2 SEASONAL ALLERGIC RHINITIS, UNSPECIFIED TRIGGER: Chronic | ICD-10-CM

## 2024-11-18 DIAGNOSIS — G89.29 NECK PAIN, CHRONIC: ICD-10-CM

## 2024-11-18 DIAGNOSIS — M54.2 NECK PAIN, CHRONIC: ICD-10-CM

## 2024-11-18 DIAGNOSIS — F41.8 DEPRESSION WITH ANXIETY: Chronic | ICD-10-CM

## 2024-11-19 RX ORDER — MONTELUKAST SODIUM 10 MG/1
10 TABLET ORAL
Qty: 30 TABLET | Refills: 0 | OUTPATIENT
Start: 2024-11-19

## 2024-11-19 RX ORDER — METHOCARBAMOL 500 MG/1
500 TABLET, FILM COATED ORAL DAILY PRN
Qty: 15 TABLET | Refills: 0 | Status: SHIPPED | OUTPATIENT
Start: 2024-11-19

## 2024-11-19 RX ORDER — DEXTROAMPHETAMINE SACCHARATE, AMPHETAMINE ASPARTATE MONOHYDRATE, DEXTROAMPHETAMINE SULFATE AND AMPHETAMINE SULFATE 2.5; 2.5; 2.5; 2.5 MG/1; MG/1; MG/1; MG/1
20 CAPSULE, EXTENDED RELEASE ORAL EVERY MORNING
Qty: 60 CAPSULE | Refills: 0 | Status: SHIPPED | OUTPATIENT
Start: 2024-11-19

## 2024-12-12 DIAGNOSIS — F90.0 ATTENTION DEFICIT HYPERACTIVITY DISORDER (ADHD), PREDOMINANTLY INATTENTIVE TYPE: ICD-10-CM

## 2024-12-12 DIAGNOSIS — J30.9 ALLERGIC RHINITIS, UNSPECIFIED SEASONALITY, UNSPECIFIED TRIGGER: Chronic | ICD-10-CM

## 2024-12-12 DIAGNOSIS — F41.8 DEPRESSION WITH ANXIETY: Chronic | ICD-10-CM

## 2024-12-12 DIAGNOSIS — G89.29 NECK PAIN, CHRONIC: ICD-10-CM

## 2024-12-12 DIAGNOSIS — G43.109 MIGRAINE WITH AURA AND WITHOUT STATUS MIGRAINOSUS, NOT INTRACTABLE: Chronic | ICD-10-CM

## 2024-12-12 DIAGNOSIS — M54.2 NECK PAIN, CHRONIC: ICD-10-CM

## 2024-12-12 DIAGNOSIS — J30.2 SEASONAL ALLERGIC RHINITIS, UNSPECIFIED TRIGGER: Chronic | ICD-10-CM

## 2024-12-12 RX ORDER — METHOCARBAMOL 500 MG/1
500 TABLET, FILM COATED ORAL DAILY PRN
Qty: 15 TABLET | Refills: 0 | Status: SHIPPED | OUTPATIENT
Start: 2024-12-12

## 2024-12-12 RX ORDER — MONTELUKAST SODIUM 10 MG/1
10 TABLET ORAL
Qty: 30 TABLET | Refills: 5 | Status: SHIPPED | OUTPATIENT
Start: 2024-12-12

## 2024-12-12 RX ORDER — SUMATRIPTAN SUCCINATE 25 MG/1
25 TABLET ORAL ONCE AS NEEDED
Qty: 30 TABLET | Refills: 0 | Status: SHIPPED | OUTPATIENT
Start: 2024-12-12

## 2024-12-12 RX ORDER — CETIRIZINE HYDROCHLORIDE 10 MG/1
10 TABLET ORAL DAILY
Qty: 30 TABLET | Refills: 5 | Status: SHIPPED | OUTPATIENT
Start: 2024-12-12

## 2024-12-13 DIAGNOSIS — F90.0 ATTENTION DEFICIT HYPERACTIVITY DISORDER (ADHD), PREDOMINANTLY INATTENTIVE TYPE: ICD-10-CM

## 2024-12-13 RX ORDER — DEXTROAMPHETAMINE SACCHARATE, AMPHETAMINE ASPARTATE MONOHYDRATE, DEXTROAMPHETAMINE SULFATE AND AMPHETAMINE SULFATE 2.5; 2.5; 2.5; 2.5 MG/1; MG/1; MG/1; MG/1
20 CAPSULE, EXTENDED RELEASE ORAL EVERY MORNING
Qty: 60 CAPSULE | Refills: 0 | Status: SHIPPED | OUTPATIENT
Start: 2024-12-18

## 2024-12-16 ENCOUNTER — TELEPHONE (OUTPATIENT)
Age: 49
End: 2024-12-16

## 2024-12-16 NOTE — TELEPHONE ENCOUNTER
Caller: Dejon/Selina    Doctor: Jovita    Reason for call: WC asking for an administrative discharge since the patient has been attending her appointments. Please fax to [phone] attn:Dejon    Call back#: [phone] Martha()

## 2025-01-29 DIAGNOSIS — F90.0 ATTENTION DEFICIT HYPERACTIVITY DISORDER (ADHD), PREDOMINANTLY INATTENTIVE TYPE: ICD-10-CM

## 2025-01-29 DIAGNOSIS — M54.2 NECK PAIN, CHRONIC: ICD-10-CM

## 2025-01-29 DIAGNOSIS — G89.29 NECK PAIN, CHRONIC: ICD-10-CM

## 2025-01-30 DIAGNOSIS — M54.2 NECK PAIN, CHRONIC: ICD-10-CM

## 2025-01-30 DIAGNOSIS — G89.29 NECK PAIN, CHRONIC: ICD-10-CM

## 2025-01-30 RX ORDER — DEXTROAMPHETAMINE SACCHARATE, AMPHETAMINE ASPARTATE MONOHYDRATE, DEXTROAMPHETAMINE SULFATE AND AMPHETAMINE SULFATE 2.5; 2.5; 2.5; 2.5 MG/1; MG/1; MG/1; MG/1
20 CAPSULE, EXTENDED RELEASE ORAL EVERY MORNING
Qty: 60 CAPSULE | Refills: 0 | Status: SHIPPED | OUTPATIENT
Start: 2025-01-30

## 2025-01-30 RX ORDER — METHOCARBAMOL 500 MG/1
500 TABLET, FILM COATED ORAL DAILY PRN
Qty: 15 TABLET | Refills: 0 | Status: SHIPPED | OUTPATIENT
Start: 2025-01-30

## 2025-01-30 NOTE — TELEPHONE ENCOUNTER
Will send one month of each medication, however patient is due for her six month follow up -- please schedule.

## 2025-01-31 RX ORDER — METHOCARBAMOL 500 MG/1
500 TABLET, FILM COATED ORAL DAILY PRN
Qty: 15 TABLET | Refills: 0 | OUTPATIENT
Start: 2025-01-31

## 2025-01-31 NOTE — TELEPHONE ENCOUNTER
1/31 ml called and lmom, then pt picked up. She said she would cb when she had her schedule in front of her.

## 2025-02-04 ENCOUNTER — TELEPHONE (OUTPATIENT)
Age: 50
End: 2025-02-04

## 2025-02-21 DIAGNOSIS — S99.911D RIGHT ANKLE INJURY, SUBSEQUENT ENCOUNTER: ICD-10-CM

## 2025-03-25 ENCOUNTER — OFFICE VISIT (OUTPATIENT)
Dept: FAMILY MEDICINE CLINIC | Facility: CLINIC | Age: 50
End: 2025-03-25
Payer: COMMERCIAL

## 2025-03-25 VITALS
TEMPERATURE: 96.4 F | OXYGEN SATURATION: 98 % | SYSTOLIC BLOOD PRESSURE: 118 MMHG | HEART RATE: 79 BPM | WEIGHT: 165.6 LBS | BODY MASS INDEX: 26.61 KG/M2 | HEIGHT: 66 IN | DIASTOLIC BLOOD PRESSURE: 80 MMHG

## 2025-03-25 DIAGNOSIS — J30.2 SEASONAL ALLERGIC RHINITIS, UNSPECIFIED TRIGGER: Chronic | ICD-10-CM

## 2025-03-25 DIAGNOSIS — G89.29 NECK PAIN, CHRONIC: ICD-10-CM

## 2025-03-25 DIAGNOSIS — Z12.11 SCREENING FOR COLORECTAL CANCER: ICD-10-CM

## 2025-03-25 DIAGNOSIS — Z00.00 ANNUAL PHYSICAL EXAM: ICD-10-CM

## 2025-03-25 DIAGNOSIS — Z12.12 SCREENING FOR COLORECTAL CANCER: ICD-10-CM

## 2025-03-25 DIAGNOSIS — F41.8 DEPRESSION WITH ANXIETY: Chronic | ICD-10-CM

## 2025-03-25 DIAGNOSIS — M54.2 NECK PAIN, CHRONIC: ICD-10-CM

## 2025-03-25 DIAGNOSIS — Z12.4 CERVICAL CANCER SCREENING: ICD-10-CM

## 2025-03-25 DIAGNOSIS — Z12.31 ENCOUNTER FOR SCREENING MAMMOGRAM FOR BREAST CANCER: ICD-10-CM

## 2025-03-25 DIAGNOSIS — Z13.1 DIABETES MELLITUS SCREENING: ICD-10-CM

## 2025-03-25 DIAGNOSIS — J45.21 MILD INTERMITTENT ASTHMA WITH ACUTE EXACERBATION: ICD-10-CM

## 2025-03-25 DIAGNOSIS — F90.0 ATTENTION DEFICIT HYPERACTIVITY DISORDER (ADHD), PREDOMINANTLY INATTENTIVE TYPE: Primary | ICD-10-CM

## 2025-03-25 DIAGNOSIS — Z13.220 LIPID SCREENING: ICD-10-CM

## 2025-03-25 PROBLEM — R03.0 ELEVATED BP WITHOUT DIAGNOSIS OF HYPERTENSION: Status: RESOLVED | Noted: 2024-07-19 | Resolved: 2025-03-25

## 2025-03-25 PROCEDURE — 99214 OFFICE O/P EST MOD 30 MIN: CPT

## 2025-03-25 PROCEDURE — 99396 PREV VISIT EST AGE 40-64: CPT

## 2025-03-25 RX ORDER — METHOCARBAMOL 500 MG/1
500 TABLET, FILM COATED ORAL DAILY PRN
Qty: 30 TABLET | Refills: 0 | Status: SHIPPED | OUTPATIENT
Start: 2025-03-25

## 2025-03-25 RX ORDER — DEXTROAMPHETAMINE SACCHARATE, AMPHETAMINE ASPARTATE MONOHYDRATE, DEXTROAMPHETAMINE SULFATE AND AMPHETAMINE SULFATE 2.5; 2.5; 2.5; 2.5 MG/1; MG/1; MG/1; MG/1
20 CAPSULE, EXTENDED RELEASE ORAL EVERY MORNING
Qty: 60 CAPSULE | Refills: 0 | Status: SHIPPED | OUTPATIENT
Start: 2025-03-25

## 2025-03-25 RX ORDER — ALBUTEROL SULFATE 90 UG/1
2 INHALANT RESPIRATORY (INHALATION) EVERY 6 HOURS PRN
Qty: 6.7 G | Refills: 1 | Status: SHIPPED | OUTPATIENT
Start: 2025-03-25

## 2025-03-25 RX ORDER — MONTELUKAST SODIUM 10 MG/1
10 TABLET ORAL
Qty: 90 TABLET | Refills: 0 | Status: SHIPPED | OUTPATIENT
Start: 2025-03-25

## 2025-03-25 NOTE — ASSESSMENT & PLAN NOTE
Well controlled on current albuterol inhaler as needed.     Orders:    albuterol (PROVENTIL HFA,VENTOLIN HFA) 90 mcg/act inhaler; Inhale 2 puffs every 6 (six) hours as needed for wheezing or shortness of breath

## 2025-03-25 NOTE — ASSESSMENT & PLAN NOTE
Depression Screening Follow-up Plan: Patient's depression screening was positive with a PHQ-9 score of 12. Patient with underlying depression and was advised to continue current medications as prescribed.    Well controlled on current prozac 20 mg, refilled today. No SI.   Follow up in six months. Sooner as needed.     Orders:    FLUoxetine (PROzac) 20 mg capsule; Take 1 capsule (20 mg total) by mouth daily

## 2025-03-25 NOTE — PROGRESS NOTES
Adult Annual Physical  Name: Lima Anglin      : 1975      MRN: [id number]  Encounter Provider: Lilly Tillman PA-C  Encounter Date: 3/25/2025   Encounter department: [facility]    Assessment & Plan  Attention deficit hyperactivity disorder (ADHD), predominantly inattentive type  Well controlled on current adderall XR 20 mg daily. Refilled today.   Updated controlled substance agreement and UDS  Follow up in six months. Sooner as needed.     Orders:    amphetamine-dextroamphetamine (ADDERALL XR) 10 MG 24 hr capsule; Take 2 capsules (20 mg total) by mouth every morning    [facility] Prescribed Meds and Special Instructions    Amphetamines, Methamphetamines    Butalbital    Phenobarbital    Secobarbital    Alprazolam    Clonazepam    Diazepam, Temazepam, Oxazepam    Lorazepam    Gabapentin    Pregabalin    Cocaine    Heroin    Buprenorphine    Levorphanol    Meperidine    Naltrexone    Fentanyl    Methadone    Oxycodone    Tapentadol    THC    Tramadol    Codeine, Hydrocodone, Hydropmorphone, Morphine    Bath Salts    Ethyl Glucuronide/Ethyl Sulfate    Kratom    Spice    Methylphenidate    Phentermine    Validity Oxidant    Validity Creatinine    Validity pH    Validity Specific    Xylazine Definitive Test    Depression with anxiety  Depression Screening Follow-up Plan: Patient's depression screening was positive with a PHQ-9 score of 12. Patient with underlying depression and was advised to continue current medications as prescribed.    Well controlled on current prozac 20 mg, refilled today. No SI.   Follow up in six months. Sooner as needed.     Orders:    FLUoxetine (PROzac) 20 mg capsule; Take 1 capsule (20 mg total) by mouth daily    Neck pain, chronic  Chronic ongoing neck pain.   Will update XR.   Can continue robaxin PRN.     Orders:    methocarbamol (ROBAXIN) 500 mg tablet; Take 1 tablet (500 mg total) by mouth daily as needed for muscle spasms    XR spine cervical  complete 4 or 5 vw non injury; Future    Seasonal allergic rhinitis, unspecified trigger  Well controlled on current singulair, refilled today.     Orders:    montelukast (SINGULAIR) 10 mg tablet; Take 1 tablet (10 mg total) by mouth daily at bedtime    Mild intermittent asthma with acute exacerbation  Well controlled on current albuterol inhaler as needed.     Orders:    albuterol (PROVENTIL HFA,VENTOLIN HFA) 90 mcg/act inhaler; Inhale 2 puffs every 6 (six) hours as needed for wheezing or shortness of breath    Annual physical exam  Physical exam unremarkable.   Routine labs ordered.   Mammogram ordered for breast cancer screening.   Cologuard ordered for colon cancer screening.   Referral to GYN placed for cervical cancer screening.   Follow up in six months. Sooner as needed.     Orders:    CBC and differential; Future    Comprehensive metabolic panel; Future    Screening for colorectal cancer    Orders:    Cologuard    Cervical cancer screening    Orders:    Ambulatory Referral to Obstetrics / Gynecology; Future    Encounter for screening mammogram for breast cancer    Orders:    Mammo screening bilateral w 3d and cad; Future    Lipid screening    Orders:    Lipid panel; Future    Diabetes mellitus screening    Orders:    Hemoglobin A1C; Future    Preventive Screenings:  - Diabetes Screening: risks/benefits discussed and orders placed  - Cholesterol Screening: orders placed and risks/benefits discussed   - Hepatitis C screening: risks/benefits discussed and patient declines   - HIV screening: patient declines and risks/benefits discussed   - Cervical cancer screening: risks/benefits discussed and orders placed   - Breast cancer screening: risks/benefits discussed and orders placed   - Colon cancer screening: risks/benefits discussed and orders placed   - Lung cancer screening: screening not indicated     Immunizations:  - Immunizations due: Influenza    Counseling/Anticipatory Guidance:    - Diet: discussed  recommendations for a healthy/well-balanced diet.   - Exercise: the importance of regular exercise/physical activity was discussed. Recommend exercise 3-5 times per week for at least 30 minutes.       Depression Screening and Follow-up Plan:   Patient with underlying depression and was advised to continue current medications as prescribed.         History of Present Illness       Presents for annual physical  No acute complaints  Needs med refills    Adult Annual Physical:  Patient presents for annual physical.     Diet and Physical Activity:  - Diet/Nutrition: no special diet.  - Exercise: 1-2 times a week on average and walking.    Depression Screening:    - PHQ-9 Score: 12    General Health:  - Sleep: sleeps poorly and 4-6 hours of sleep on average.  - Hearing: decreased hearing left ear and normal hearing right ear.  - Vision: most recent eye exam < 1 year ago, wears glasses and wears contacts.  - Dental: regular dental visits.    /GYN Health:  - Follows with GYN: yes.   - Menopause: premenopausal.   - History of STDs: no    Review of Systems   Respiratory:  Negative for chest tightness, shortness of breath and wheezing.    Cardiovascular:  Negative for chest pain and palpitations.   Gastrointestinal:  Negative for abdominal pain.   Musculoskeletal:  Positive for arthralgias and neck pain.   Neurological:  Positive for headaches. Negative for dizziness and light-headedness.     Medical History Reviewed by provider this encounter:  Tobacco  Allergies  Meds  Problems  Med Hx  Surg Hx  Fam Hx     .  Past Medical History   Past Medical History:   Diagnosis Date    ADHD     Allergic     Ankle fracture     RIGHT    Asthma     exercise induced or allergy induced asthma    Depression     Diverticulitis of colon     IBS (irritable colon syndrome)      Past Surgical History:   Procedure Laterality Date    BREAST SURGERY  2012    pea sized lump removed - benign    CARPAL TUNNEL RELEASE Bilateral 2013     "COLONOSCOPY      FOOT SURGERY Left     Bunion surgery    ORIF TIBIA & FIBULA FRACTURES Right 9/10/2024    Procedure: OPEN REDUCTION W/ INTERNAL FIXATION (ORIF) ANKLE;  Surgeon: Jurgen Hussein DO;  Location: [facility];  Service: Orthopedics     Family History   Problem Relation Age of Onset    Depression Mother     Diabetes Mother     ADD / ADHD Mother     Anxiety disorder Mother     No Known Problems Father     Breast cancer Maternal Aunt     Anesthesia problems Neg Hx       reports that she has never smoked. She has never used smokeless tobacco. She reports that she does not currently use alcohol. She reports that she does not use drugs.  Current Outpatient Medications   Medication Instructions    albuterol (PROVENTIL HFA,VENTOLIN HFA) 90 mcg/act inhaler 2 puffs, Inhalation, Every 6 hours PRN    amphetamine-dextroamphetamine (ADDERALL XR) 10 MG 24 hr capsule 20 mg, Oral, Every morning    azelastine (ASTELIN) 0.1 % nasal spray 1 spray, Nasal, Daily at bedtime, Use in each nostril as directed    cetirizine (ZYRTEC) 10 mg, Oral, Daily    Cholecalciferol (VITAMIN D-3 PO) Oral    EPINEPHrine (EPIPEN) 0.3 mg/0.3 mL SOAJ 9/6/24 per pt has one EPI pen on hand - last use approx 3 weeks ago ( with exposure to Tide sport)    FLUoxetine (PROZAC) 20 mg, Oral, Daily    fluticasone (FLONASE) 50 mcg/act nasal spray 2 sprays, Nasal, Daily    methocarbamol (ROBAXIN) 500 mg, Oral, Daily PRN    montelukast (SINGULAIR) 10 mg, Oral, Daily at bedtime    SUMAtriptan (IMITREX) 25 mg, Oral, Once as needed     Allergies   Allergen Reactions    Codeine Edema     Swelling, throat itchy     Other Facial Swelling     Tide sport- eyes and lips swell     Dog Epithelium Sneezing     Sneezing- allergist confirmed    Lactose Intolerance (Gi) - Food Allergy Diarrhea    Peanut Oil - Food Allergy Diarrhea     Allergist confirmed - per pt \" did report eats peanuts all the time \"      Current Outpatient Medications on File Prior to Visit   Medication " Sig Dispense Refill    azelastine (ASTELIN) 0.1 % nasal spray 1 spray into each nostril daily at bedtime Use in each nostril as directed      cetirizine (ZyrTEC) 10 mg tablet Take 1 tablet (10 mg total) by mouth daily 30 tablet 5    Cholecalciferol (VITAMIN D-3 PO) Take by mouth      EPINEPHrine (EPIPEN) 0.3 mg/0.3 mL SOAJ 9/6/24 per pt has one EPI pen on hand - last use approx 3 weeks ago ( with exposure to Tide sport)      fluticasone (FLONASE) 50 mcg/act nasal spray 2 sprays into each nostril daily      SUMAtriptan (IMITREX) 25 mg tablet Take 1 tablet (25 mg total) by mouth once as needed for migraine for up to 30 doses 30 tablet 0    [DISCONTINUED] acetaminophen (TYLENOL) 500 mg tablet Take 2 tablets (1,000 mg total) by mouth every 8 (eight) hours (Patient not taking: Reported on 11/11/2024) 100 tablet 0    [DISCONTINUED] albuterol (PROVENTIL HFA,VENTOLIN HFA) 90 mcg/act inhaler       [DISCONTINUED] amphetamine-dextroamphetamine (ADDERALL XR) 10 MG 24 hr capsule Take 2 capsules (20 mg total) by mouth every morning 60 capsule 0    [DISCONTINUED] EPINEPHrine (EPIPEN) 0.3 mg/0.3 mL SOAJ Inject 0.3 mL (0.3 mg total) into a muscle once for 1 dose (Patient not taking: Reported on 9/16/2024) 0.6 mL 0    [DISCONTINUED] FLUoxetine (PROzac) 20 mg capsule Take 1 capsule (20 mg total) by mouth daily 30 capsule 5    [DISCONTINUED] ibuprofen (MOTRIN) 800 mg tablet Take 1 tablet (800 mg total) by mouth 2 (two) times a day 40 tablet 2    [DISCONTINUED] methocarbamol (ROBAXIN) 500 mg tablet Take 1 tablet (500 mg total) by mouth daily as needed for muscle spasms 15 tablet 0    [DISCONTINUED] montelukast (SINGULAIR) 10 mg tablet Take 1 tablet (10 mg total) by mouth daily at bedtime 30 tablet 5    [DISCONTINUED] neomycin-polymyxin-hydrocortisone (CORTISPORIN) otic solution Administer 4 drops into the left ear every 6 (six) hours for 7 days (Patient not taking: Reported on 9/16/2024) 10 mL 0     No current facility-administered  "medications on file prior to visit.      Social History     Tobacco Use    Smoking status: Never    Smokeless tobacco: Never    Tobacco comments:     Never a smoker or any use of tobacco products per pt    Vaping Use    Vaping status: Never Used   Substance and Sexual Activity    Alcohol use: Not Currently     Comment: social    Drug use: Never     Comment: Denies any drug use per pt    Sexual activity: Yes     Partners: Male     Birth control/protection: Male Sterilization     Comment: Denies any chest pain or shortness of breath with activity       Objective   /80   Pulse 79   Temp (!) 96.4 °F (35.8 °C)   Ht 5' 6\" (1.676 m)   Wt 75.1 kg (165 lb 9.6 oz)   SpO2 98%   BMI 26.73 kg/m²     Physical Exam  Vitals reviewed.   Constitutional:       General: She is not in acute distress.     Appearance: Normal appearance. She is not ill-appearing or diaphoretic.   HENT:      Head: Normocephalic and atraumatic.      Right Ear: Tympanic membrane, ear canal and external ear normal. There is no impacted cerumen.      Left Ear: Tympanic membrane, ear canal and external ear normal. There is no impacted cerumen.      Nose: Nose normal. No congestion or rhinorrhea.      Mouth/Throat:      Mouth: Mucous membranes are moist.      Pharynx: Oropharynx is clear. No oropharyngeal exudate or posterior oropharyngeal erythema.   Eyes:      General:         Right eye: No discharge.         Left eye: No discharge.      Conjunctiva/sclera: Conjunctivae normal.   Cardiovascular:      Rate and Rhythm: Normal rate and regular rhythm.      Heart sounds: Normal heart sounds. No murmur heard.  Pulmonary:      Effort: Pulmonary effort is normal. No respiratory distress.      Breath sounds: Normal breath sounds. No wheezing, rhonchi or rales.   Abdominal:      General: Bowel sounds are normal. There is no distension.      Palpations: Abdomen is soft.      Tenderness: There is no abdominal tenderness.   Musculoskeletal:      Cervical back: " Neck supple.      Right lower leg: No edema.      Left lower leg: No edema.   Lymphadenopathy:      Cervical: No cervical adenopathy.   Skin:     General: Skin is warm.   Neurological:      General: No focal deficit present.      Mental Status: She is alert.      Gait: Gait normal.   Psychiatric:         Mood and Affect: Mood normal.

## 2025-03-25 NOTE — ASSESSMENT & PLAN NOTE
Well controlled on current adderall XR 20 mg daily. Refilled today.   Updated controlled substance agreement and UDS  Follow up in six months. Sooner as needed.     Orders:    amphetamine-dextroamphetamine (ADDERALL XR) 10 MG 24 hr capsule; Take 2 capsules (20 mg total) by mouth every morning    [facility] All Prescribed Meds and Special Instructions    Amphetamines, Methamphetamines    Butalbital    Phenobarbital    Secobarbital    Alprazolam    Clonazepam    Diazepam, Temazepam, Oxazepam    Lorazepam    Gabapentin    Pregabalin    Cocaine    Heroin    Buprenorphine    Levorphanol    Meperidine    Naltrexone    Fentanyl    Methadone    Oxycodone    Tapentadol    THC    Tramadol    Codeine, Hydrocodone, Hydropmorphone, Morphine    Bath Salts    Ethyl Glucuronide/Ethyl Sulfate    Kratom    Spice    Methylphenidate    Phentermine    Validity Oxidant    Validity Creatinine    Validity pH    Validity Specific    Xylazine Definitive Test

## 2025-03-25 NOTE — PATIENT INSTRUCTIONS
"Patient Education     Routine physical for adults   The Basics   Written by the doctors and editors at [facility]   What is a physical? -- A physical is a routine visit, or \"check-up,\" with your doctor. You might also hear it called a \"wellness visit\" or \"preventive visit.\"  During each visit, the doctor will:   Ask about your physical and mental health   Ask about your habits, behaviors, and lifestyle   Do an exam   Give you vaccines if needed   Talk to you about any medicines you take   Give advice about your health   Answer your questions  Getting regular check-ups is an important part of taking care of your health. It can help your doctor find and treat any problems you have. But it's also important for preventing health problems.  A routine physical is different from a \"sick visit.\" A sick visit is when you see a doctor because of a health concern or problem. Since physicals are scheduled ahead of time, you can think about what you want to ask the doctor.  How often should I get a physical? -- It depends on your age and health. In general, for people age 21 years and older:   If you are younger than 50 years, you might be able to get a physical every 3 years.   If you are 50 years or older, your doctor might recommend a physical every year.  If you have an ongoing health condition, like diabetes or high blood pressure, your doctor will probably want to see you more often.  What happens during a physical? -- In general, each visit will include:   Physical exam - The doctor or nurse will check your height, weight, heart rate, and blood pressure. They will also look at your eyes and ears. They will ask about how you are feeling and whether you have any symptoms that bother you.   Medicines - It's a good idea to bring a list of all the medicines you take to each doctor visit. Your doctor will talk to you about your medicines and answer any questions. Tell them if you are having any side effects that bother you. You " "should also tell them if you are having trouble paying for any of your medicines.   Habits and behaviors - This includes:   Your diet   Your exercise habits   Whether you smoke, drink alcohol, or use drugs   Whether you are sexually active   Whether you feel safe at home  Your doctor will talk to you about things you can do to improve your health and lower your risk of health problems. They will also offer help and support. For example, if you want to quit smoking, they can give you advice and might prescribe medicines. If you want to improve your diet or get more physical activity, they can help you with this, too.   Lab tests, if needed - The tests you get will depend on your age and situation. For example, your doctor might want to check your:   Cholesterol   Blood sugar   Iron level   Vaccines - The recommended vaccines will depend on your age, health, and what vaccines you already had. Vaccines are very important because they can prevent certain serious or deadly infections.   Discussion of screening - \"Screening\" means checking for diseases or other health problems before they cause symptoms. Your doctor can recommend screening based on your age, risk, and preferences. This might include tests to check for:   Cancer, such as breast, prostate, cervical, ovarian, colorectal, prostate, lung, or skin cancer   Sexually transmitted infections, such as chlamydia and gonorrhea   Mental health conditions like depression and anxiety  Your doctor will talk to you about the different types of screening tests. They can help you decide which screenings to have. They can also explain what the results might mean.   Answering questions - The physical is a good time to ask the doctor or nurse questions about your health. If needed, they can refer you to other doctors or specialists, too.  Adults older than 65 years often need other care, too. As you get older, your doctor will talk to you about:   How to prevent falling at " home   Hearing or vision tests   Memory testing   How to take your medicines safely   Making sure that you have the help and support you need at home  All topics are updated as new evidence becomes available and our peer review process is complete.  This topic retrieved from [employer] on: May 02, 2024.  Topic 325805 Version 1.0  Release: 32.4.3 - C32.122  © 2024 UpToDate, Inc. and/or its affiliates. All rights reserved.  Consumer Information Use and Disclaimer   Disclaimer: This generalized information is a limited summary of diagnosis, treatment, and/or medication information. It is not meant to be comprehensive and should be used as a tool to help the user understand and/or assess potential diagnostic and treatment options. It does NOT include all information about conditions, treatments, medications, side effects, or risks that may apply to a specific patient. It is not intended to be medical advice or a substitute for the medical advice, diagnosis, or treatment of a health care provider based on the health care provider's examination and assessment of a patient's specific and unique circumstances. Patients must speak with a health care provider for complete information about their health, medical questions, and treatment options, including any risks or benefits regarding use of medications. This information does not endorse any treatments or medications as safe, effective, or approved for treating a specific patient. UpToDate, Inc. and its affiliates disclaim any warranty or liability relating to this information or the use thereof.The use of this information is governed by the Terms of Use, available at https://www.woltersCrowdlinkeruwer.com/en/know/clinical-effectiveness-terms. 2024© UpToDate, Inc. and its affiliates and/or licensors. All rights reserved.  Copyright   © 2024 UpToDate, Inc. and/or its affiliates. All rights reserved.

## 2025-03-25 NOTE — ASSESSMENT & PLAN NOTE
Chronic ongoing neck pain.   Will update XR.   Can continue robaxin PRN.     Orders:    methocarbamol (ROBAXIN) 500 mg tablet; Take 1 tablet (500 mg total) by mouth daily as needed for muscle spasms    XR spine cervical complete 4 or 5 vw non injury; Future

## 2025-03-25 NOTE — ASSESSMENT & PLAN NOTE
Well controlled on current singulair, refilled today.     Orders:    montelukast (SINGULAIR) 10 mg tablet; Take 1 tablet (10 mg total) by mouth daily at bedtime

## 2025-03-28 LAB
4OH-XYLAZINE UR QL CFM: NEGATIVE NG/ML
6MAM UR QL CFM: NEGATIVE NG/ML
7AMINOCLONAZEPAM UR QL CFM: NEGATIVE NG/ML
A-OH ALPRAZ UR QL CFM: NEGATIVE NG/ML
ACCEPTABLE CREAT UR QL: NORMAL MG/DL
ACCEPTIBLE SP GR UR QL: NORMAL
AMPHET UR QL CFM: NORMAL NG/ML
BUPRENORPHINE UR QL CFM: NEGATIVE NG/ML
BUTALBITAL UR QL CFM: NEGATIVE NG/ML
BZE UR QL CFM: NEGATIVE NG/ML
CODEINE UR QL CFM: NEGATIVE NG/ML
EDDP UR QL CFM: NEGATIVE NG/ML
ETHYL GLUCURONIDE UR QL CFM: ABNORMAL NG/ML
ETHYL SULFATE UR QL SCN: ABNORMAL NG/ML
EUTYLONE UR QL: NEGATIVE NG/ML
FENTANYL UR QL CFM: NEGATIVE NG/ML
GLIADIN IGG SER IA-ACNC: NEGATIVE NG/ML
HYDROCODONE UR QL CFM: NEGATIVE NG/ML
HYDROMORPHONE UR QL CFM: NEGATIVE NG/ML
LORAZEPAM UR QL CFM: NEGATIVE NG/ML
ME-PHENIDATE UR QL CFM: NEGATIVE NG/ML
MEPERIDINE UR QL CFM: NEGATIVE NG/ML
METHADONE UR QL CFM: NEGATIVE NG/ML
METHAMPHET UR QL CFM: NEGATIVE NG/ML
MORPHINE UR QL CFM: NEGATIVE NG/ML
NALTREXONE UR QL CFM: NEGATIVE NG/ML
NITRITE UR QL: NORMAL UG/ML
NORBUPRENORPHINE UR QL CFM: NEGATIVE NG/ML
NORDIAZEPAM UR QL CFM: NEGATIVE NG/ML
NORFENTANYL UR QL CFM: NEGATIVE NG/ML
NORHYDROCODONE UR QL CFM: NEGATIVE NG/ML
NORMEPERIDINE UR QL CFM: NEGATIVE NG/ML
NOROXYCODONE UR QL CFM: NEGATIVE NG/ML
OXAZEPAM UR QL CFM: NEGATIVE NG/ML
OXYCODONE UR QL CFM: NEGATIVE NG/ML
OXYMORPHONE UR QL CFM: NEGATIVE NG/ML
PARA-FLUOROFENTANYL QUANTIFICATION: NORMAL NG/ML
PHENOBARB UR QL CFM: NEGATIVE NG/ML
RESULT ALL_PRESCRIBED MEDS AND SPECIAL INSTRUCTIONS: NORMAL
SECOBARBITAL UR QL CFM: NEGATIVE NG/ML
SL AMB 5F-ADB-M7 METABOLITE QUANTIFICATION: NEGATIVE NG/ML
SL AMB 7-OH-MITRAGYNINE (KRATOM ALKALOID) QUANTIFICATION: NEGATIVE NG/ML
SL AMB AB-FUBINACA-M3 METABOLITE QUANTIFICATION: NEGATIVE NG/ML
SL AMB ACETYL FENTANYL QUANTIFICATION: NORMAL NG/ML
SL AMB ACETYL NORFENTANYL QUANTIFICATION: NORMAL NG/ML
SL AMB ACRYL FENTANYL QUANTIFICATION: NORMAL NG/ML
SL AMB CARFENTANIL QUANTIFICATION: NORMAL NG/ML
SL AMB CTHC (MARIJUANA METABOLITE) QUANTIFICATION: NEGATIVE NG/ML
SL AMB DEXTRORPHAN (DEXTROMETHORPHAN METABOLITE) QUANT: ABNORMAL NG/ML
SL AMB GABAPENTIN QUANTIFICATION: NEGATIVE NG/ML
SL AMB JWH018 METABOLITE QUANTIFICATION: NEGATIVE NG/ML
SL AMB JWH073 METABOLITE QUANTIFICATION: NEGATIVE NG/ML
SL AMB MDMB-FUBINACA-M1 METABOLITE QUANTIFICATION: NEGATIVE NG/ML
SL AMB METHYLONE QUANTIFICATION: NEGATIVE NG/ML
SL AMB N-DESMETHYL-TRAMADOL QUANTIFICATION: NEGATIVE NG/ML
SL AMB PHENTERMINE QUANTIFICATION: NEGATIVE NG/ML
SL AMB PREGABALIN QUANTIFICATION: NEGATIVE NG/ML
SL AMB RCS4 METABOLITE QUANTIFICATION: NEGATIVE NG/ML
SL AMB RITALINIC ACID QUANTIFICATION: NEGATIVE NG/ML
SMOOTH MUSCLE AB TITR SER IF: NEGATIVE NG/ML
SPECIMEN DRAWN SERPL: NEGATIVE NG/ML
SPECIMEN PH ACCEPTABLE UR: NORMAL
TAPENTADOL UR QL CFM: NEGATIVE NG/ML
TEMAZEPAM UR QL CFM: NEGATIVE NG/ML
TRAMADOL UR QL CFM: NEGATIVE NG/ML
URATE/CREAT 24H UR: NEGATIVE NG/ML
XYLAZINE UR QL CFM: NEGATIVE NG/ML

## 2025-03-31 ENCOUNTER — RESULTS FOLLOW-UP (OUTPATIENT)
Dept: FAMILY MEDICINE CLINIC | Facility: CLINIC | Age: 50
End: 2025-03-31

## 2025-05-01 DIAGNOSIS — G89.29 NECK PAIN, CHRONIC: ICD-10-CM

## 2025-05-01 DIAGNOSIS — F90.0 ATTENTION DEFICIT HYPERACTIVITY DISORDER (ADHD), PREDOMINANTLY INATTENTIVE TYPE: ICD-10-CM

## 2025-05-01 DIAGNOSIS — M54.2 NECK PAIN, CHRONIC: ICD-10-CM

## 2025-05-01 RX ORDER — METHOCARBAMOL 500 MG/1
500 TABLET, FILM COATED ORAL DAILY PRN
Qty: 30 TABLET | Refills: 0 | Status: CANCELLED | OUTPATIENT
Start: 2025-05-01

## 2025-05-01 RX ORDER — DEXTROAMPHETAMINE SACCHARATE, AMPHETAMINE ASPARTATE MONOHYDRATE, DEXTROAMPHETAMINE SULFATE AND AMPHETAMINE SULFATE 2.5; 2.5; 2.5; 2.5 MG/1; MG/1; MG/1; MG/1
20 CAPSULE, EXTENDED RELEASE ORAL EVERY MORNING
Qty: 60 CAPSULE | Refills: 0 | Status: CANCELLED | OUTPATIENT
Start: 2025-05-01

## 2025-05-14 DIAGNOSIS — F90.8 OTHER SPECIFIED ATTENTION DEFICIT HYPERACTIVITY DISORDER (ADHD): Primary | Chronic | ICD-10-CM

## 2025-05-14 RX ORDER — DEXTROAMPHETAMINE SACCHARATE, AMPHETAMINE ASPARTATE, DEXTROAMPHETAMINE SULFATE AND AMPHETAMINE SULFATE 5; 5; 5; 5 MG/1; MG/1; MG/1; MG/1
20 TABLET ORAL DAILY
Qty: 30 TABLET | Refills: 0 | Status: SHIPPED | OUTPATIENT
Start: 2025-05-14

## 2025-05-14 NOTE — TELEPHONE ENCOUNTER
I sent in the new adderall dosage, I'd like her to follow up in four weeks after starting the new dose

## 2025-05-22 DIAGNOSIS — G89.29 NECK PAIN, CHRONIC: ICD-10-CM

## 2025-05-22 DIAGNOSIS — M54.2 NECK PAIN, CHRONIC: ICD-10-CM

## 2025-05-22 DIAGNOSIS — G43.109 MIGRAINE WITH AURA AND WITHOUT STATUS MIGRAINOSUS, NOT INTRACTABLE: Chronic | ICD-10-CM

## 2025-05-23 RX ORDER — METHOCARBAMOL 500 MG/1
500 TABLET, FILM COATED ORAL DAILY PRN
Qty: 30 TABLET | Refills: 0 | Status: SHIPPED | OUTPATIENT
Start: 2025-05-23

## 2025-05-23 RX ORDER — SUMATRIPTAN SUCCINATE 25 MG/1
25 TABLET ORAL ONCE AS NEEDED
Qty: 30 TABLET | Refills: 0 | Status: SHIPPED | OUTPATIENT
Start: 2025-05-23

## 2025-06-23 DIAGNOSIS — G89.29 NECK PAIN, CHRONIC: ICD-10-CM

## 2025-06-23 DIAGNOSIS — M54.2 NECK PAIN, CHRONIC: ICD-10-CM

## 2025-06-23 DIAGNOSIS — F90.8 OTHER SPECIFIED ATTENTION DEFICIT HYPERACTIVITY DISORDER (ADHD): Chronic | ICD-10-CM

## 2025-06-24 DIAGNOSIS — F41.8 DEPRESSION WITH ANXIETY: Chronic | ICD-10-CM

## 2025-06-24 DIAGNOSIS — J30.2 SEASONAL ALLERGIC RHINITIS, UNSPECIFIED TRIGGER: Chronic | ICD-10-CM

## 2025-06-24 RX ORDER — METHOCARBAMOL 500 MG/1
500 TABLET, FILM COATED ORAL DAILY PRN
Qty: 30 TABLET | Refills: 0 | Status: SHIPPED | OUTPATIENT
Start: 2025-06-24

## 2025-06-24 RX ORDER — DEXTROAMPHETAMINE SACCHARATE, AMPHETAMINE ASPARTATE, DEXTROAMPHETAMINE SULFATE AND AMPHETAMINE SULFATE 5; 5; 5; 5 MG/1; MG/1; MG/1; MG/1
20 TABLET ORAL DAILY
Qty: 30 TABLET | Refills: 0 | Status: SHIPPED | OUTPATIENT
Start: 2025-06-24

## 2025-06-26 DIAGNOSIS — M54.2 NECK PAIN, CHRONIC: ICD-10-CM

## 2025-06-26 DIAGNOSIS — F90.8 OTHER SPECIFIED ATTENTION DEFICIT HYPERACTIVITY DISORDER (ADHD): Chronic | ICD-10-CM

## 2025-06-26 DIAGNOSIS — G89.29 NECK PAIN, CHRONIC: ICD-10-CM

## 2025-06-26 DIAGNOSIS — F41.8 DEPRESSION WITH ANXIETY: Chronic | ICD-10-CM

## 2025-06-26 DIAGNOSIS — J30.2 SEASONAL ALLERGIC RHINITIS, UNSPECIFIED TRIGGER: Chronic | ICD-10-CM

## 2025-06-26 RX ORDER — MONTELUKAST SODIUM 10 MG/1
10 TABLET ORAL
Qty: 90 TABLET | Refills: 1 | Status: SHIPPED | OUTPATIENT
Start: 2025-06-26

## 2025-06-26 RX ORDER — MONTELUKAST SODIUM 10 MG/1
10 TABLET ORAL
Qty: 90 TABLET | Refills: 0 | Status: CANCELLED | OUTPATIENT
Start: 2025-06-26

## 2025-06-26 RX ORDER — METHOCARBAMOL 500 MG/1
500 TABLET, FILM COATED ORAL DAILY PRN
Qty: 30 TABLET | Refills: 0 | Status: CANCELLED | OUTPATIENT
Start: 2025-06-26

## 2025-06-26 RX ORDER — DEXTROAMPHETAMINE SACCHARATE, AMPHETAMINE ASPARTATE, DEXTROAMPHETAMINE SULFATE AND AMPHETAMINE SULFATE 5; 5; 5; 5 MG/1; MG/1; MG/1; MG/1
20 TABLET ORAL DAILY
Qty: 30 TABLET | Refills: 0 | Status: CANCELLED | OUTPATIENT
Start: 2025-06-26

## 2025-07-18 RX ORDER — IBUPROFEN 800 MG/1
800 TABLET, FILM COATED ORAL 2 TIMES DAILY
Qty: 40 TABLET | Refills: 0 | OUTPATIENT
Start: 2025-07-18

## 2025-07-28 ENCOUNTER — OFFICE VISIT (OUTPATIENT)
Dept: URGENT CARE | Facility: CLINIC | Age: 50
End: 2025-07-28

## 2025-07-28 VITALS
TEMPERATURE: 98.7 F | DIASTOLIC BLOOD PRESSURE: 88 MMHG | RESPIRATION RATE: 18 BRPM | HEART RATE: 108 BPM | OXYGEN SATURATION: 99 % | SYSTOLIC BLOOD PRESSURE: 168 MMHG

## 2025-07-28 DIAGNOSIS — F41.8 DEPRESSION WITH ANXIETY: Chronic | ICD-10-CM

## 2025-07-28 DIAGNOSIS — M54.2 NECK PAIN, CHRONIC: ICD-10-CM

## 2025-07-28 DIAGNOSIS — F90.8 OTHER SPECIFIED ATTENTION DEFICIT HYPERACTIVITY DISORDER (ADHD): Chronic | ICD-10-CM

## 2025-07-28 DIAGNOSIS — J30.2 SEASONAL ALLERGIC RHINITIS, UNSPECIFIED TRIGGER: Chronic | ICD-10-CM

## 2025-07-28 DIAGNOSIS — J01.40 ACUTE NON-RECURRENT PANSINUSITIS: Primary | ICD-10-CM

## 2025-07-28 DIAGNOSIS — G89.29 NECK PAIN, CHRONIC: ICD-10-CM

## 2025-07-28 PROCEDURE — G0382 LEV 3 HOSP TYPE B ED VISIT: HCPCS

## 2025-07-28 RX ORDER — PREDNISONE 10 MG/1
TABLET ORAL
Qty: 26 TABLET | Refills: 0 | Status: SHIPPED | OUTPATIENT
Start: 2025-07-28

## 2025-07-28 RX ORDER — BROMPHENIRAMINE MALEATE, PSEUDOEPHEDRINE HYDROCHLORIDE, AND DEXTROMETHORPHAN HYDROBROMIDE 2; 30; 10 MG/5ML; MG/5ML; MG/5ML
5 SYRUP ORAL 4 TIMES DAILY PRN
Qty: 120 ML | Refills: 0 | Status: SHIPPED | OUTPATIENT
Start: 2025-07-28

## 2025-07-30 RX ORDER — MONTELUKAST SODIUM 10 MG/1
10 TABLET ORAL
Qty: 90 TABLET | Refills: 0 | OUTPATIENT
Start: 2025-07-30

## 2025-07-30 RX ORDER — METHOCARBAMOL 500 MG/1
500 TABLET, FILM COATED ORAL DAILY PRN
Qty: 30 TABLET | Refills: 0 | Status: SHIPPED | OUTPATIENT
Start: 2025-07-30

## 2025-07-30 RX ORDER — DEXTROAMPHETAMINE SACCHARATE, AMPHETAMINE ASPARTATE, DEXTROAMPHETAMINE SULFATE AND AMPHETAMINE SULFATE 5; 5; 5; 5 MG/1; MG/1; MG/1; MG/1
20 TABLET ORAL DAILY
Qty: 30 TABLET | Refills: 0 | Status: SHIPPED | OUTPATIENT
Start: 2025-07-30

## (undated) DEVICE — SPLINT COMFORT 4 X 30

## (undated) DEVICE — GLOVE SRG BIOGEL 8

## (undated) DEVICE — ACE WRAP 4 IN STERILE

## (undated) DEVICE — 2.5MM DRILL BIT/QC/GOLD/110MM

## (undated) DEVICE — BETHLEHEM UNIVERSAL  MIONR EXT: Brand: CARDINAL HEALTH

## (undated) DEVICE — 3.5MM CORTEX SCREW SELF-TAPPING 46MM: Type: IMPLANTABLE DEVICE | Site: ANKLE | Status: NON-FUNCTIONAL

## (undated) DEVICE — CHLORAPREP HI-LITE 26ML ORANGE

## (undated) DEVICE — GLOVE SRG BIOGEL ECLIPSE 7.5

## (undated) DEVICE — CAST PADDING 4 IN SYNTHETIC STRL

## (undated) DEVICE — SUT VICRYL 0 CT-1 27 IN J260H

## (undated) DEVICE — 10FR FRAZIER SUCTION HANDLE: Brand: CARDINAL HEALTH

## (undated) DEVICE — C-ARM: Brand: UNBRANDED

## (undated) DEVICE — 3.5MM CORTEX SCREW SELF-TAPPING 42MM: Type: IMPLANTABLE DEVICE | Site: ANKLE | Status: NON-FUNCTIONAL

## (undated) DEVICE — INTENDED FOR TISSUE SEPARATION, AND OTHER PROCEDURES THAT REQUIRE A SHARP SURGICAL BLADE TO PUNCTURE OR CUT.: Brand: BARD-PARKER SAFETY BLADES SIZE 15, STERILE

## (undated) DEVICE — ALL PURPOSE SPONGES,NON-WOVEN, 4 PLY: Brand: CURITY

## (undated) DEVICE — PAD CAST 4 IN COTTON NON STERILE

## (undated) DEVICE — GLOVE SRG BIOGEL ECLIPSE 8

## (undated) DEVICE — SUT VICRYL 2-0 CP-1 27 IN J266H

## (undated) DEVICE — TELFA NON-ADHERENT ABSORBENT DRESSING: Brand: TELFA

## (undated) DEVICE — 1.25MM NON-THREADED GUIDE WIRE 150MM
Type: IMPLANTABLE DEVICE | Site: ANKLE | Status: NON-FUNCTIONAL
Removed: 2024-09-10

## (undated) DEVICE — NEEDLE 18 G X 1 1/2 SAFETY

## (undated) DEVICE — GLOVE INDICATOR PI UNDERGLOVE SZ 7.5 BLUE

## (undated) DEVICE — DRAPE STERI 1010 18IN X 12IN

## (undated) DEVICE — NEPTUNE E-SEP SMOKE EVACUATION PENCIL, COATED, 70MM BLADE, PUSH BUTTON SWITCH: Brand: NEPTUNE E-SEP

## (undated) DEVICE — GLOVE INDICATOR PI UNDERGLOVE SZ 8 BLUE

## (undated) DEVICE — EXOFIN PRECISION PEN HIGH VISCOSITY TOPICAL SKIN ADHESIVE: Brand: EXOFIN PRECISION PEN, 1G

## (undated) DEVICE — THREE-QUARTER SHEET: Brand: CONVERTORS

## (undated) DEVICE — ACE WRAP 6 IN STERILE

## (undated) DEVICE — BANDAGE, ESMARK LF STR 6"X9' (20/CS): Brand: CYPRESS

## (undated) DEVICE — CUFF TOURNIQUET 30 X 4 IN QUICK CONNECT DISP 1BLA